# Patient Record
Sex: MALE | Race: WHITE | Employment: FULL TIME | URBAN - METROPOLITAN AREA
[De-identification: names, ages, dates, MRNs, and addresses within clinical notes are randomized per-mention and may not be internally consistent; named-entity substitution may affect disease eponyms.]

---

## 2021-09-24 ENCOUNTER — HOSPITAL ENCOUNTER (OUTPATIENT)
Dept: LAB | Age: 61
Discharge: HOME | End: 2021-09-24
Payer: COMMERCIAL

## 2021-09-24 PROCEDURE — 83519 RIA NONANTIBODY: CPT

## 2021-09-24 PROCEDURE — 36415 COLL VENOUS BLD VENIPUNCTURE: CPT

## 2021-10-04 LAB
FAX TO INFO,FAXT: NORMAL
FAX TO NUMBER,FAXN: NORMAL

## 2021-11-04 LAB
ACHR AB SER-SCNC: 10.6 NMOL/L (ref 0–0.24)
ACHR BLOCK AB SER-ACNC: 58 % (ref 0–25)
ACHR MOD AB/ACHR TOTAL SFR SER: ABNORMAL %

## 2021-11-29 ENCOUNTER — HOSPITAL ENCOUNTER (OUTPATIENT)
Dept: INFUSION THERAPY | Age: 61
Discharge: HOME | End: 2021-11-29
Payer: COMMERCIAL

## 2021-11-29 VITALS
RESPIRATION RATE: 18 BRPM | HEART RATE: 104 BPM | SYSTOLIC BLOOD PRESSURE: 127 MMHG | DIASTOLIC BLOOD PRESSURE: 81 MMHG | OXYGEN SATURATION: 98 % | TEMPERATURE: 97.5 F

## 2021-11-29 LAB
BASO+EOS+MONOS # BLD AUTO: 0.4 K/UL (ref 0–2.3)
BASO+EOS+MONOS NFR BLD AUTO: 11 % (ref 0.1–17)
DIFFERENTIAL METHOD BLD: ABNORMAL
ERYTHROCYTE [DISTWIDTH] IN BLOOD BY AUTOMATED COUNT: 14.5 % (ref 11.5–14.5)
HCT VFR BLD AUTO: 54.4 % (ref 36–48)
HGB BLD-MCNC: 9.5 G/DL (ref 12–16)
LYMPHOCYTES # BLD: 1 K/UL (ref 1.1–5.9)
LYMPHOCYTES NFR BLD: 24 % (ref 14–44)
MCH RBC QN AUTO: 15.7 PG (ref 25–35)
MCHC RBC AUTO-ENTMCNC: 17.5 G/DL (ref 31–37)
MCV RBC AUTO: 89.9 FL (ref 78–102)
NEUTS SEG # BLD: 2.8 K/UL (ref 1.8–9.5)
NEUTS SEG NFR BLD: 66 % (ref 40–70)
PLATELET # BLD AUTO: 315 K/UL (ref 140–440)
RBC # BLD AUTO: 6.05 M/UL (ref 4.1–5.1)
WBC # BLD AUTO: 4.2 K/UL (ref 4.5–13)

## 2021-11-29 PROCEDURE — 99195 PHLEBOTOMY: CPT

## 2021-11-29 PROCEDURE — 85025 COMPLETE CBC W/AUTO DIFF WBC: CPT

## 2021-11-29 PROCEDURE — 74011250636 HC RX REV CODE- 250/636: Performed by: FAMILY MEDICINE

## 2021-11-29 RX ORDER — ESCITALOPRAM OXALATE 20 MG/1
20 TABLET ORAL DAILY
COMMUNITY

## 2021-11-29 RX ORDER — SODIUM CHLORIDE 9 MG/ML
500 INJECTION, SOLUTION INTRAVENOUS CONTINUOUS
Status: DISCONTINUED | OUTPATIENT
Start: 2021-11-29 | End: 2021-11-30 | Stop reason: HOSPADM

## 2021-11-29 RX ADMIN — SODIUM CHLORIDE 500 ML: 0.9 INJECTION, SOLUTION INTRAVENOUS at 10:45

## 2021-11-29 NOTE — PROGRESS NOTES
[facility] Progress Note    Date: 2021    Name: Rikki Wilson    MRN: [id number]         : 1960      Mr. Saray Caban arrived to [facility] at 0900 foe labs to determine if he will require therapeutic phlebotomy. Oriented patient to unit and educated on  therapeutic phlebotomy procedure and provided [employer] handouts. Pt verbalized understanding. Mr. Saray Caban was assessed and education was provided. Mr. Yamilka Marquez vitals were reviewed. Visit Vitals  /81 (BP 1 Location: Left lower arm)   Pulse (!) 104   Temp 97.5 °F (36.4 °C)   Resp 18   SpO2 98%       Blood drawn for labs via Left forearm right, condition patent and no redness venipuncture x1 attempt using a 20 g PIV, brisk blood return, secured with tegaderm. Lab results were obtained and reviewed. Recent Results (from the past 12 hour(s))   CBC WITH 3 PART DIFF    Collection Time: 21  9:15 AM   Result Value Ref Range    WBC 4.2 (L) 4.5 - 13.0 K/uL    RBC 6.05 (H) 4.10 - 5.10 M/uL    HGB 9.5 (L) 12.0 - 16.0 g/dL    HCT 54.4 (HH) 36 - 48 %    MCV 89.9 78 - 102 FL    MCH 15.7 (L) 25.0 - 35.0 PG    MCHC 17.5 (L) 31 - 37 g/dL    RDW 14.5 11.5 - 14.5 %    PLATELET 162 452 - 670 K/uL    NEUTROPHILS 66 40 - 70 %    MIXED CELLS 11 0.1 - 17 %    LYMPHOCYTES 24 14 - 44 %    ABS. NEUTROPHILS 2.8 1.8 - 9.5 K/UL    ABS. MIXED CELLS 0.4 0.0 - 2.3 K/uL    ABS. LYMPHOCYTES 1.0 (L) 1.1 - 5.9 K/UL    DF AUTOMATED         Hgb 9.5 and Hct 54.4     Therapeutic phlebotomy initiated at 0930 via 20 g PIV. Therapeutic phlebotomy ended at 1045 with approximately 500 ml of blood obtained followed by 500ml of NS administered as post-phlebotomy hydration per orders. Pt offered snack/ drink throughout his visit. Mr. Saray Caban tolerated well without complaints. IV removed/ intact. Gauze/ coban to site. Mr. Saray Caban was discharged from [place] [address] in stable condition at 1115.   He is to return on 2021 at 0900 for his next appointment for labs and potential .therapeutic phlebotomy.     Delsa Leventhal, RN  November 29, 2021

## 2021-12-08 ENCOUNTER — HOSPITAL ENCOUNTER (OUTPATIENT)
Dept: INFUSION THERAPY | Age: 61
Discharge: HOME | End: 2021-12-08
Payer: COMMERCIAL

## 2021-12-08 VITALS
RESPIRATION RATE: 18 BRPM | TEMPERATURE: 98 F | HEART RATE: 100 BPM | DIASTOLIC BLOOD PRESSURE: 77 MMHG | OXYGEN SATURATION: 98 % | SYSTOLIC BLOOD PRESSURE: 138 MMHG

## 2021-12-08 LAB
BASO+EOS+MONOS # BLD AUTO: 0.5 K/UL (ref 0–2.3)
BASO+EOS+MONOS NFR BLD AUTO: 6 % (ref 0.1–17)
DIFFERENTIAL METHOD BLD: ABNORMAL
ERYTHROCYTE [DISTWIDTH] IN BLOOD BY AUTOMATED COUNT: 14.1 % (ref 11.5–14.5)
HCT VFR BLD AUTO: 53.3 % (ref 36–48)
HGB BLD-MCNC: 17.7 G/DL (ref 12–16)
LYMPHOCYTES # BLD: 1.7 K/UL (ref 1.1–5.9)
LYMPHOCYTES NFR BLD: 19 % (ref 14–44)
MCH RBC QN AUTO: 30.1 PG (ref 25–35)
MCHC RBC AUTO-ENTMCNC: 33.2 G/DL (ref 31–37)
MCV RBC AUTO: 90.5 FL (ref 78–102)
NEUTS SEG # BLD: 6.8 K/UL (ref 1.8–9.5)
NEUTS SEG NFR BLD: 76 % (ref 40–70)
PLATELET # BLD AUTO: 291 K/UL (ref 140–440)
RBC # BLD AUTO: 5.89 M/UL (ref 4.1–5.1)
WBC # BLD AUTO: 9 K/UL (ref 4.5–13)

## 2021-12-08 PROCEDURE — 74011250636 HC RX REV CODE- 250/636: Performed by: FAMILY MEDICINE

## 2021-12-08 PROCEDURE — 99195 PHLEBOTOMY: CPT

## 2021-12-08 PROCEDURE — 85025 COMPLETE CBC W/AUTO DIFF WBC: CPT

## 2021-12-08 RX ORDER — SODIUM CHLORIDE 9 MG/ML
500 INJECTION, SOLUTION INTRAVENOUS CONTINUOUS
Status: DISCONTINUED | OUTPATIENT
Start: 2021-12-08 | End: 2021-12-09 | Stop reason: HOSPADM

## 2021-12-08 RX ADMIN — SODIUM CHLORIDE 500 ML: 9 INJECTION, SOLUTION INTRAVENOUS at 10:35

## 2021-12-08 NOTE — PROGRESS NOTES
[facility] Progress Note    Date: 2021    Name: Brendon Gomez    MRN: [id number]         : 1960      Mr. Bhavik Hernandez arrived to [facility] at 0900 for labs to determine if he will require therapeutic phlebotomy. Pt denies any reactions or complications from last weeks therapeutic phlebotomy. Mr. Bhavik Hernandez was assessed and education was provided. Mr. Keo Vasquez vitals were reviewed. Visit Vitals  /77 (BP 1 Location: Left upper arm)   Pulse 100   Temp 98 °F (36.7 °C)   Resp 18   SpO2 98%       Blood drawn for labs via Left  hand condition patent and no redness venipuncture x1 attempt using a 20 g PIV, brisk blood return, secured with tegaderm. Lab results were obtained and reviewed. Recent Results (from the past 12 hour(s))   CBC WITH 3 PART DIFF    Collection Time: 21  9:15 AM   Result Value Ref Range    WBC 9.0 4.5 - 13.0 K/uL    RBC 5.89 (H) 4.10 - 5.10 M/uL    HGB 17.7 (HH) 12.0 - 16.0 g/dL    HCT 53.3 (HH) 36 - 48 %    MCV 90.5 78 - 102 FL    MCH 30.1 25.0 - 35.0 PG    MCHC 33.2 31 - 37 g/dL    RDW 14.1 11.5 - 14.5 %    PLATELET 986 673 - 915 K/uL    NEUTROPHILS 76 (H) 40 - 70 %    MIXED CELLS 6 0.1 - 17 %    LYMPHOCYTES 19 14 - 44 %    ABS. NEUTROPHILS 6.8 1.8 - 9.5 K/UL    ABS. MIXED CELLS 0.5 0.0 - 2.3 K/uL    ABS. LYMPHOCYTES 1.7 1.1 - 5.9 K/UL    DF AUTOMATED         Hgb 17.7 and Hct 53.3 ( normal for diagnosis, this is why patient is being treated in clinic)     Therapeutic phlebotomy initiated at 0930 via 20 g PIV. Therapeutic phlebotomy ended at 1035 with approximately 500 ml of blood obtained followed by 500ml of NS administered as post-phlebotomy hydration per orders. Pt offered snack/ drink throughout his visit. Mr. Bhavik Hernandez tolerated well without complaints. IV removed/ intact. Gauze/ coban to site. Mr. Bhavik Hernandez was discharged from [place] [address] in stable condition at 1110.   He is to return on 12/15/2021 at 0900 for his next appointment for labs and potential .therapeutic phlebotomy.     Yamilet Nichols RN  December 8, 2021

## 2021-12-15 ENCOUNTER — HOSPITAL ENCOUNTER (OUTPATIENT)
Dept: INFUSION THERAPY | Age: 61
Discharge: HOME | End: 2021-12-15
Payer: COMMERCIAL

## 2021-12-15 VITALS
RESPIRATION RATE: 18 BRPM | HEART RATE: 86 BPM | OXYGEN SATURATION: 98 % | TEMPERATURE: 98.3 F | SYSTOLIC BLOOD PRESSURE: 150 MMHG | DIASTOLIC BLOOD PRESSURE: 81 MMHG

## 2021-12-15 LAB
BASO+EOS+MONOS # BLD AUTO: 0.6 K/UL (ref 0–2.3)
BASO+EOS+MONOS NFR BLD AUTO: 7 % (ref 0.1–17)
DIFFERENTIAL METHOD BLD: ABNORMAL
ERYTHROCYTE [DISTWIDTH] IN BLOOD BY AUTOMATED COUNT: 14.1 % (ref 11.5–14.5)
HCT VFR BLD AUTO: 52.8 % (ref 36–48)
HGB BLD-MCNC: 17.2 G/DL (ref 12–16)
LYMPHOCYTES # BLD: 1.9 K/UL (ref 1.1–5.9)
LYMPHOCYTES NFR BLD: 21 % (ref 14–44)
MCH RBC QN AUTO: 29.5 PG (ref 25–35)
MCHC RBC AUTO-ENTMCNC: 32.6 G/DL (ref 31–37)
MCV RBC AUTO: 90.4 FL (ref 78–102)
NEUTS SEG # BLD: 6.9 K/UL (ref 1.8–9.5)
NEUTS SEG NFR BLD: 73 % (ref 40–70)
PLATELET # BLD AUTO: 289 K/UL (ref 140–440)
RBC # BLD AUTO: 5.84 M/UL (ref 4.1–5.1)
WBC # BLD AUTO: 9.4 K/UL (ref 4.5–13)

## 2021-12-15 PROCEDURE — 85025 COMPLETE CBC W/AUTO DIFF WBC: CPT

## 2021-12-15 PROCEDURE — 74011250636 HC RX REV CODE- 250/636: Performed by: FAMILY MEDICINE

## 2021-12-15 PROCEDURE — 99195 PHLEBOTOMY: CPT

## 2021-12-15 RX ORDER — SODIUM CHLORIDE 0.9 % (FLUSH) 0.9 %
5-10 SYRINGE (ML) INJECTION AS NEEDED
Status: DISCONTINUED | OUTPATIENT
Start: 2021-12-15 | End: 2021-12-17 | Stop reason: HOSPADM

## 2021-12-15 RX ORDER — SODIUM CHLORIDE 9 MG/ML
500 INJECTION, SOLUTION INTRAVENOUS CONTINUOUS
Status: DISCONTINUED | OUTPATIENT
Start: 2021-12-15 | End: 2021-12-16 | Stop reason: HOSPADM

## 2021-12-15 RX ADMIN — Medication 10 ML: at 11:00

## 2021-12-15 RX ADMIN — SODIUM CHLORIDE 500 ML: 0.9 INJECTION, SOLUTION INTRAVENOUS at 10:05

## 2021-12-15 NOTE — PROGRESS NOTES
[facility]    Date: December 15, 2021    Name: Cameron Carter    MRN: [id number]         : 1960      Mr. Kiera Crawford arrived to [facility] at 0900 for labs to determine if he will require therapeutic phlebotomy. Pt denies any reactions or complications from last weeks therapeutic phlebotomy. Mr. Kiera Crawford was assessed and education was provided. Mr. Jose Enrique Valdovinos vitals were reviewed. Visit Vitals  BP (!) 150/81 (BP 1 Location: Left upper arm)   Pulse 86   Temp 98.3 °F (36.8 °C)   Resp 18   SpO2 98%       Blood drawn for labs via Left  hand condition patent and no redness venipuncture x2 attempt using a 20 g PIV, brisk blood return, secured with tegaderm. Lab results were obtained and reviewed. Recent Results (from the past 12 hour(s))   CBC WITH 3 PART DIFF    Collection Time: 12/15/21  9:15 AM   Result Value Ref Range    WBC 9.4 4.5 - 13.0 K/uL    RBC 5.84 (H) 4.10 - 5.10 M/uL    HGB 17.2 (HH) 12.0 - 16.0 g/dL    HCT 52.8 (HH) 36 - 48 %    MCV 90.4 78 - 102 FL    MCH 29.5 25.0 - 35.0 PG    MCHC 32.6 31 - 37 g/dL    RDW 14.1 11.5 - 14.5 %    PLATELET 297 651 - 140 K/uL    NEUTROPHILS 73 (H) 40 - 70 %    MIXED CELLS 7 0.1 - 17 %    LYMPHOCYTES 21 14 - 44 %    ABS. NEUTROPHILS 6.9 1.8 - 9.5 K/UL    ABS. MIXED CELLS 0.6 0.0 - 2.3 K/uL    ABS. LYMPHOCYTES 1.9 1.1 - 5.9 K/UL    DF AUTOMATED         Hgb 17.2 and Hct 52.8 ( normal for diagnosis, this is why patient is being treated in clinic)     Therapeutic phlebotomy initiated at 0925 via 20 g PIV. Therapeutic phlebotomy ended at 1000 with approximately 500 ml of blood obtained followed by 500ml of NS administered as post-phlebotomy hydration per orders. Pt offered snack/ drink throughout his visit. Mr. Kiera Crawford tolerated well without complaints. IV removed/ intact. Gauze/ coban to site. Mr. Kiera Crawford was discharged from [place] [address] in stable condition at 1100.   He is to return on 2021 at 0900 for his next appointment for labs and potential .therapeutic phlebotomy.     Calvin Sheffield RN  December 15, 2021

## 2021-12-22 ENCOUNTER — APPOINTMENT (OUTPATIENT)
Dept: INFUSION THERAPY | Age: 61
End: 2021-12-22
Payer: COMMERCIAL

## 2021-12-23 ENCOUNTER — HOSPITAL ENCOUNTER (OUTPATIENT)
Dept: INFUSION THERAPY | Age: 61
Discharge: HOME | End: 2021-12-23
Payer: COMMERCIAL

## 2021-12-23 VITALS
OXYGEN SATURATION: 95 % | DIASTOLIC BLOOD PRESSURE: 80 MMHG | TEMPERATURE: 98 F | HEART RATE: 74 BPM | SYSTOLIC BLOOD PRESSURE: 150 MMHG | RESPIRATION RATE: 18 BRPM

## 2021-12-23 LAB
BASO+EOS+MONOS # BLD AUTO: 0.5 K/UL (ref 0–2.3)
BASO+EOS+MONOS NFR BLD AUTO: 7 % (ref 0.1–17)
DIFFERENTIAL METHOD BLD: ABNORMAL
ERYTHROCYTE [DISTWIDTH] IN BLOOD BY AUTOMATED COUNT: 13.8 % (ref 11.5–14.5)
HCT VFR BLD AUTO: 47.1 % (ref 36–48)
HGB BLD-MCNC: 15.6 G/DL (ref 12–16)
LYMPHOCYTES # BLD: 1.5 K/UL (ref 1.1–5.9)
LYMPHOCYTES NFR BLD: 20 % (ref 14–44)
MCH RBC QN AUTO: 29.9 PG (ref 25–35)
MCHC RBC AUTO-ENTMCNC: 33.1 G/DL (ref 31–37)
MCV RBC AUTO: 90.4 FL (ref 78–102)
NEUTS SEG # BLD: 5.7 K/UL (ref 1.8–9.5)
NEUTS SEG NFR BLD: 73 % (ref 40–70)
PLATELET # BLD AUTO: 284 K/UL (ref 140–440)
RBC # BLD AUTO: 5.21 M/UL (ref 4.1–5.1)
WBC # BLD AUTO: 7.7 K/UL (ref 4.5–13)

## 2021-12-23 PROCEDURE — 36415 COLL VENOUS BLD VENIPUNCTURE: CPT

## 2021-12-23 PROCEDURE — 85025 COMPLETE CBC W/AUTO DIFF WBC: CPT

## 2021-12-23 NOTE — PROGRESS NOTES
[facility] Progress Note    Date: 2021    Name: Teresa Ibarra    MRN: [id number]         : 1960      Mr. Sunday Delgado arrived to [facility] at 1100 for labs to determine if he will require therapeutic phlebotomy. Pt denies any reactions or complications from last weeks therapeutic phlebotomy. Mr. Sunday Delgado was assessed and education was provided. Mr. Fernando Marcial vitals were reviewed. Visit Vitals  BP (!) 150/80 (BP 1 Location: Left upper arm, BP Patient Position: Sitting)   Pulse 74   Temp 98 °F (36.7 °C)   Resp 18   SpO2 95%       20 gauge PIV placed to his left hand x 1 attempt per patient request. Brisk blood return noted. CBC obtained. Lab results were obtained and reviewed. Recent Results (from the past 12 hour(s))   CBC WITH 3 PART DIFF    Collection Time: 21 11:02 AM   Result Value Ref Range    WBC 7.7 4.5 - 13.0 K/uL    RBC 5.21 (H) 4.10 - 5.10 M/uL    HGB 15.6 12.0 - 16.0 g/dL    HCT 47.1 36 - 48 %    MCV 90.4 78 - 102 FL    MCH 29.9 25.0 - 35.0 PG    MCHC 33.1 31 - 37 g/dL    RDW 13.8 11.5 - 14.5 %    PLATELET 849 050 - 602 K/uL    NEUTROPHILS 73 (H) 40 - 70 %    MIXED CELLS 7 0.1 - 17 %    LYMPHOCYTES 20 14 - 44 %    ABS. NEUTROPHILS 5.7 1.8 - 9.5 K/UL    ABS. MIXED CELLS 0.5 0.0 - 2.3 K/uL    ABS. LYMPHOCYTES 1.5 1.1 - 5.9 K/UL    DF AUTOMATED         Hgb 15.6 and Hct 47.1    Therapeutic phlebotomy HELD for Hct <50.    yMr. Arnulfo tolerated well without complaints. IV removed/ intact. Gauze/ coban to site. Mr. Sunday Delgado was discharged from [place] [address] in stable condition at 1115. He is to return on 2022 at 0900 for his next appointment for labs and potential therapeutic phlebotomy.     Kanika Wood  2021

## 2021-12-29 ENCOUNTER — TRANSCRIBE ORDER (OUTPATIENT)
Dept: SCHEDULING | Age: 61
End: 2021-12-29

## 2021-12-29 DIAGNOSIS — G70.00 MYASTHENIA GRAVIS WITHOUT EXACERBATION (HCC): Primary | ICD-10-CM

## 2022-01-12 ENCOUNTER — HOSPITAL ENCOUNTER (OUTPATIENT)
Dept: CT IMAGING | Age: 62
Discharge: HOME | End: 2022-01-12
Attending: PSYCHIATRY & NEUROLOGY
Payer: COMMERCIAL

## 2022-01-12 DIAGNOSIS — G70.00 MYASTHENIA GRAVIS WITHOUT EXACERBATION (HCC): ICD-10-CM

## 2022-01-12 PROCEDURE — 71260 CT THORAX DX C+: CPT

## 2022-01-12 PROCEDURE — 74011000636 HC RX REV CODE- 636: Performed by: PSYCHIATRY & NEUROLOGY

## 2022-01-12 RX ADMIN — IOPAMIDOL 100 ML: 612 INJECTION, SOLUTION INTRAVENOUS at 14:22

## 2022-01-26 ENCOUNTER — HOSPITAL ENCOUNTER (OUTPATIENT)
Dept: INFUSION THERAPY | Age: 62
Discharge: HOME | End: 2022-01-26
Payer: COMMERCIAL

## 2022-01-26 VITALS
TEMPERATURE: 97 F | OXYGEN SATURATION: 93 % | HEART RATE: 63 BPM | DIASTOLIC BLOOD PRESSURE: 80 MMHG | SYSTOLIC BLOOD PRESSURE: 122 MMHG | RESPIRATION RATE: 18 BRPM

## 2022-01-26 LAB
BASO+EOS+MONOS # BLD AUTO: 0.6 K/UL (ref 0–2.3)
BASO+EOS+MONOS NFR BLD AUTO: 7 % (ref 0.1–17)
DIFFERENTIAL METHOD BLD: ABNORMAL
ERYTHROCYTE [DISTWIDTH] IN BLOOD BY AUTOMATED COUNT: 13.5 % (ref 11.5–14.5)
HCT VFR BLD AUTO: 51.4 % (ref 36–48)
HGB BLD-MCNC: 16.7 G/DL (ref 12–16)
LYMPHOCYTES # BLD: 2 K/UL (ref 1.1–5.9)
LYMPHOCYTES NFR BLD: 22 % (ref 14–44)
MCH RBC QN AUTO: 28.8 PG (ref 25–35)
MCHC RBC AUTO-ENTMCNC: 32.5 G/DL (ref 31–37)
MCV RBC AUTO: 88.8 FL (ref 78–102)
NEUTS SEG # BLD: 6.5 K/UL (ref 1.8–9.5)
NEUTS SEG NFR BLD: 72 % (ref 40–70)
RBC # BLD AUTO: 5.79 M/UL (ref 4.1–5.1)
WBC # BLD AUTO: 9.1 K/UL (ref 4.5–13)

## 2022-01-26 PROCEDURE — 99195 PHLEBOTOMY: CPT

## 2022-01-26 PROCEDURE — 74011250636 HC RX REV CODE- 250/636: Performed by: FAMILY MEDICINE

## 2022-01-26 PROCEDURE — 85025 COMPLETE CBC W/AUTO DIFF WBC: CPT

## 2022-01-26 PROCEDURE — 36415 COLL VENOUS BLD VENIPUNCTURE: CPT

## 2022-01-26 PROCEDURE — 74011000250 HC RX REV CODE- 250: Performed by: FAMILY MEDICINE

## 2022-01-26 RX ORDER — SODIUM CHLORIDE 9 MG/ML
250 INJECTION, SOLUTION INTRAVENOUS ONCE
Status: COMPLETED | OUTPATIENT
Start: 2022-01-26 | End: 2022-01-26

## 2022-01-26 RX ORDER — SODIUM CHLORIDE 9 MG/ML
10-40 INJECTION INTRAMUSCULAR; INTRAVENOUS; SUBCUTANEOUS AS NEEDED
Status: DISCONTINUED | OUTPATIENT
Start: 2022-01-26 | End: 2022-01-27 | Stop reason: HOSPADM

## 2022-01-26 RX ADMIN — SODIUM CHLORIDE 250 ML: 0.9 INJECTION, SOLUTION INTRAVENOUS at 11:05

## 2022-01-26 RX ADMIN — SODIUM CHLORIDE, PRESERVATIVE FREE 10 ML: 5 INJECTION INTRAVENOUS at 11:06

## 2022-01-26 NOTE — PROGRESS NOTES
[facility] Progress Note    Date: 2022    Name: Vaughn Roman    MRN: [id number]         : 1960      Mr. Mikayla Amador arrived to [facility] at 6905 for labs to determine if he will require therapeutic phlebotomy. Pt denies any reactions or complications from last weeks therapeutic phlebotomy. Mr. Mikayla Amador was assessed and education was provided. Mr. Jaquita Oppenheim vitals were reviewed. Visit Vitals  /88   Pulse 68   Temp 97 °F (36.1 °C)   Resp 18   SpO2 93%       Blood drawn for labs via right hand condition patent and no redness venipuncture x1 attempt using a 23g collection needle, brisk blood return, applied Band-Aid to site. Lab results were obtained and reviewed. Recent Results (from the past 12 hour(s))   CBC WITH 3 PART DIFF    Collection Time: 22  9:00 AM   Result Value Ref Range    WBC 9.1 4.5 - 13.0 K/uL    RBC 5.79 (H) 4.10 - 5.10 M/uL    HGB 16.7 (HH) 12.0 - 16.0 g/dL    HCT 51.4 (HH) 36 - 48 %    MCV 88.8 78 - 102 FL    MCH 28.8 25.0 - 35.0 PG    MCHC 32.5 31 - 37 g/dL    RDW 13.5 11.5 - 14.5 %    NEUTROPHILS 72 (H) 40 - 70 %    MIXED CELLS 7 0.1 - 17 %    LYMPHOCYTES 22 14 - 44 %    ABS. NEUTROPHILS 6.5 1.8 - 9.5 K/UL    ABS. MIXED CELLS 0.6 0.0 - 2.3 K/uL    ABS. LYMPHOCYTES 2.0 1.1 - 5.9 K/UL    DF AUTOMATED         Hgb 16.7 and Hct 51.4 (normal for diagnosis, this is why patient is being treated in clinic)     Therapeutic phlebotomy initiated at 0930 via 20 g PIV. Therapeutic phlebotomy ended at 1035 with approximately 500 ml of blood obtained followed by 250ml of NS administered as post-phlebotomy hydration per orders. Pt offered snack/ drink throughout his visit. Mr. Mikayla Amador tolerated well without complaints. Discharge instructions given, patient gave verbal understanding. IV removed/ intact. Gauze/ tape to site. Mr. Mikayla Amador was discharged from [place] [address] in stable condition at 1135.   He is to return on 22 at 0900 for his next appointment for labs and potential .therapeutic phlebotomy.     Pallavi Huthcison RN  January 26, 2022

## 2022-02-02 ENCOUNTER — HOSPITAL ENCOUNTER (OUTPATIENT)
Dept: INFUSION THERAPY | Age: 62
Discharge: HOME | End: 2022-02-02
Payer: COMMERCIAL

## 2022-02-02 VITALS
DIASTOLIC BLOOD PRESSURE: 83 MMHG | HEART RATE: 67 BPM | RESPIRATION RATE: 18 BRPM | OXYGEN SATURATION: 99 % | SYSTOLIC BLOOD PRESSURE: 130 MMHG | TEMPERATURE: 98.1 F

## 2022-02-02 LAB
BASO+EOS+MONOS # BLD AUTO: 0.6 K/UL (ref 0–2.3)
BASO+EOS+MONOS NFR BLD AUTO: 7 % (ref 0.1–17)
DIFFERENTIAL METHOD BLD: ABNORMAL
ERYTHROCYTE [DISTWIDTH] IN BLOOD BY AUTOMATED COUNT: 13.6 % (ref 11.5–14.5)
HCT VFR BLD AUTO: 46.3 % (ref 36–48)
HGB BLD-MCNC: 14.1 G/DL (ref 12–16)
LYMPHOCYTES # BLD: 1.9 K/UL (ref 1.1–5.9)
LYMPHOCYTES NFR BLD: 23 % (ref 14–44)
MCH RBC QN AUTO: 27.1 PG (ref 25–35)
MCHC RBC AUTO-ENTMCNC: 30.5 G/DL (ref 31–37)
MCV RBC AUTO: 89 FL (ref 78–102)
NEUTS SEG # BLD: 5.5 K/UL (ref 1.8–9.5)
NEUTS SEG NFR BLD: 70 % (ref 40–70)
PLATELET # BLD AUTO: 304 K/UL (ref 140–440)
RBC # BLD AUTO: 5.2 M/UL (ref 4.1–5.1)
WBC # BLD AUTO: 8 K/UL (ref 4.5–13)

## 2022-02-02 PROCEDURE — 36415 COLL VENOUS BLD VENIPUNCTURE: CPT

## 2022-02-02 PROCEDURE — 85025 COMPLETE CBC W/AUTO DIFF WBC: CPT

## 2022-02-02 NOTE — PROGRESS NOTES
[facility] Progress Note    Date: 2022    Name: Domenico Torres    MRN: [id number]         : 1960     Therapeutic Phlebotomy      Mr. Иван Lazaro arrived to [facility] at 0900 for labs to determine if he will require therapeutic phlebotomy. Pt denies any reactions or complications from last weeks therapeutic phlebotomy. Mr. Иван Lazaro was assessed and education was provided. Mr. Jose Aiken vitals were reviewed. Visit Vitals  /83 (BP 1 Location: Right upper arm, BP Patient Position: Sitting)   Pulse 67   Temp 98.1 °F (36.7 °C)   SpO2 99%       20 gauge PIV placed to his left hand x forth attempt per patient request. Brisk blood return noted. CBC obtained. Lab results were obtained and reviewed. Recent Results (from the past 12 hour(s))   CBC WITH 3 PART DIFF    Collection Time: 22  9:40 AM   Result Value Ref Range    WBC 8.0 4.5 - 13.0 K/uL    RBC 5.20 (H) 4.10 - 5.10 M/uL    HGB 14.1 12.0 - 16.0 g/dL    HCT 46.3 36 - 48 %    MCV 89.0 78 - 102 FL    MCH 27.1 25.0 - 35.0 PG    MCHC 30.5 (L) 31 - 37 g/dL    RDW 13.6 11.5 - 14.5 %    PLATELET 256 678 - 651 K/uL    NEUTROPHILS 70 40 - 70 %    MIXED CELLS 7 0.1 - 17 %    LYMPHOCYTES 23 14 - 44 %    ABS. NEUTROPHILS 5.5 1.8 - 9.5 K/UL    ABS. MIXED CELLS 0.6 0.0 - 2.3 K/uL    ABS. LYMPHOCYTES 1.9 1.1 - 5.9 K/UL    DF AUTOMATED         Hgb 14.1 and Hct 46.3 (Expected outcome for diagnosis). Therapeutic phlebotomy HELD for Hct <50. He will be seen again in a month per orders. Mr. Иван Lazaro tolerated well without complaints. IV removed/ intact. Gauze/ tape to site. Mr. Иван Lazaro was discharged from [place] [address] in stable condition at 46. He is to return on 22 at 0900 for his next appointment for labs and potential therapeutic phlebotomy.     Jairo Niño RN  2022

## 2022-03-02 ENCOUNTER — HOSPITAL ENCOUNTER (OUTPATIENT)
Dept: INFUSION THERAPY | Age: 62
Discharge: HOME | End: 2022-03-02
Payer: COMMERCIAL

## 2022-03-02 VITALS
OXYGEN SATURATION: 97 % | SYSTOLIC BLOOD PRESSURE: 155 MMHG | RESPIRATION RATE: 18 BRPM | TEMPERATURE: 98.7 F | DIASTOLIC BLOOD PRESSURE: 80 MMHG | HEART RATE: 70 BPM

## 2022-03-02 LAB
BASO+EOS+MONOS # BLD AUTO: 0.7 K/UL (ref 0–2.3)
BASO+EOS+MONOS NFR BLD AUTO: 8 % (ref 0.1–17)
DIFFERENTIAL METHOD BLD: ABNORMAL
ERYTHROCYTE [DISTWIDTH] IN BLOOD BY AUTOMATED COUNT: 13.2 % (ref 11.5–14.5)
HCT VFR BLD AUTO: 48.3 % (ref 36–48)
HGB BLD-MCNC: 15.5 G/DL (ref 12–16)
LYMPHOCYTES # BLD: 1.9 K/UL (ref 1.1–5.9)
LYMPHOCYTES NFR BLD: 22 % (ref 14–44)
MCH RBC QN AUTO: 28.1 PG (ref 25–35)
MCHC RBC AUTO-ENTMCNC: 32.1 G/DL (ref 31–37)
MCV RBC AUTO: 87.5 FL (ref 78–102)
NEUTS SEG # BLD: 6.2 K/UL (ref 1.8–9.5)
NEUTS SEG NFR BLD: 70 % (ref 40–70)
PLATELET # BLD AUTO: 323 K/UL (ref 140–440)
RBC # BLD AUTO: 5.52 M/UL (ref 4.1–5.1)
WBC # BLD AUTO: 8.8 K/UL (ref 4.5–13)

## 2022-03-02 PROCEDURE — 85025 COMPLETE CBC W/AUTO DIFF WBC: CPT

## 2022-03-02 PROCEDURE — 36415 COLL VENOUS BLD VENIPUNCTURE: CPT

## 2022-03-02 NOTE — PROGRESS NOTES
[facility] Progress Note    Date: 2022    Name: Abhilash Toledo    MRN: [id number]         : 1960     Therapeutic Phlebotomy      Mr. Juvencio Gaming arrived to [address] [place] [address] at 1282 for CBC to determine if he will require therapeutic phlebotomy. Pt denies any reactions or complications from last weeks therapeutic phlebotomy. Mr. Juvencio Gaming was assessed and education was provided. Mr. Patricio Loud vitals were reviewed. Visit Vitals  BP (!) 155/80 (BP 1 Location: Right upper arm, BP Patient Position: At rest)   Pulse 70   Temp 98.7 °F (37.1 °C)   Resp 18   SpO2 97%       20 gauge PIV placed to his left hand x forth attempt per patient request. Brisk blood return noted. CBC obtained. Lab results were obtained and reviewed. Recent Results (from the past 12 hour(s))   CBC WITH 3 PART DIFF    Collection Time: 22  9:03 AM   Result Value Ref Range    WBC 8.8 4.5 - 13.0 K/uL    RBC 5.52 (H) 4.10 - 5.10 M/uL    HGB 15.5 12.0 - 16.0 g/dL    HCT 48.3 (H) 36 - 48 %    MCV 87.5 78 - 102 FL    MCH 28.1 25.0 - 35.0 PG    MCHC 32.1 31 - 37 g/dL    RDW 13.2 11.5 - 14.5 %    PLATELET 278 824 - 048 K/uL    NEUTROPHILS 70 40 - 70 %    Mixed cells 8 0.1 - 17 %    LYMPHOCYTES 22 14 - 44 %    ABS. NEUTROPHILS 6.2 1.8 - 9.5 K/UL    ABS. MIXED CELLS 0.7 0.0 - 2.3 K/uL    ABS. LYMPHOCYTES 1.9 1.1 - 5.9 K/UL    DF AUTOMATED         Hgb 15.5 and Hct 48.3 (Expected outcome for diagnosis). Therapeutic phlebotomy HELD for Hct <50. He will be seen again in a month per orders. Mr. Juvencio aGming tolerated well without complaints. IV removed/ intact. Gauze/ tape to site. Mr. Juvencio Gaming was discharged from [place] [address] in stable condition at 0910. He is to return on 22 at 0800 for his next appointment for labs and potential therapeutic phlebotomy.     Beulah Costa RN  2022

## 2022-03-30 ENCOUNTER — APPOINTMENT (OUTPATIENT)
Dept: INFUSION THERAPY | Age: 62
End: 2022-03-30
Payer: COMMERCIAL

## 2022-04-13 ENCOUNTER — HOSPITAL ENCOUNTER (OUTPATIENT)
Dept: INFUSION THERAPY | Age: 62
Discharge: HOME | End: 2022-04-13
Payer: COMMERCIAL

## 2022-04-13 VITALS
SYSTOLIC BLOOD PRESSURE: 165 MMHG | OXYGEN SATURATION: 98 % | HEART RATE: 86 BPM | RESPIRATION RATE: 18 BRPM | TEMPERATURE: 98.2 F | DIASTOLIC BLOOD PRESSURE: 92 MMHG

## 2022-04-13 PROCEDURE — 99195 PHLEBOTOMY: CPT

## 2022-04-13 NOTE — PROGRESS NOTES
[facility] Progress Note    Date: 2022    Name: Gia Pinto    MRN: [id number]         : 1960      Mr. Dwayne Arias arrived to [facility] at 0800 for labs to determine if he will require therapeutic phlebotomy. Pt denies any reactions or complications from last weeks therapeutic phlebotomy. Mr. Dwayne Arias was assessed and education was provided. Mr. Laura Edmondson vitals were reviewed. Visit Vitals  BP (!) 165/92   Pulse 86   Temp 98.2 °F (36.8 °C)   Resp 18   SpO2 98%       Blood drawn for labs via Left  hand condition patent and no redness venipuncture x3 attempt using a 20 g PIV, brisk blood return, secured with tegaderm. Hgb 15.5  and Hct 50.9 ( normal for diagnosis, this is why patient is being treated in clinic)     See labs scanned in chart     Therapeutic phlebotomy initiated at 0820 via 20 g PIV. Therapeutic phlebotomy ended at 1000 with approximately 500 ml of blood obtained. Visual blood clots visualized in PIV, flushed site multiple times, and removed blood via 10 ml syringe ( with stop cock, to assure a closed system)    Pt was very tolerating of extra time required for procedure. Pt offered snack/ drink throughout his visit. Mr. Dwayne Arias tolerated well without complaints. Pt kindly declined 500 ml normal saline IV, witnessed patient drinking 32 ounces of ice water after to procedure to replace fluids. IV removed/ intact. Gauze/ coban to site. Mr. Dwayne Arias was discharged from [place] [address] in stable condition at 1000. He is to return on 2021 at 0800 for his next appointment for labs and potential .therapeutic phlebotomy.     Enrique Barbosa RN  2022

## 2022-04-21 ENCOUNTER — HOSPITAL ENCOUNTER (OUTPATIENT)
Dept: INFUSION THERAPY | Age: 62
Discharge: HOME | End: 2022-04-21
Payer: COMMERCIAL

## 2022-04-21 VITALS
TEMPERATURE: 98.2 F | SYSTOLIC BLOOD PRESSURE: 141 MMHG | DIASTOLIC BLOOD PRESSURE: 75 MMHG | RESPIRATION RATE: 18 BRPM | OXYGEN SATURATION: 97 % | HEART RATE: 75 BPM

## 2022-04-21 LAB
BASO+EOS+MONOS # BLD AUTO: 0.6 K/UL (ref 0–2.3)
BASO+EOS+MONOS NFR BLD AUTO: 8 % (ref 0.1–17)
DIFFERENTIAL METHOD BLD: ABNORMAL
ERYTHROCYTE [DISTWIDTH] IN BLOOD BY AUTOMATED COUNT: 14.1 % (ref 11.5–14.5)
HCT VFR BLD AUTO: 48 % (ref 36–48)
HGB BLD-MCNC: 15.2 G/DL (ref 12–16)
LYMPHOCYTES # BLD: 1.6 K/UL (ref 1.1–5.9)
LYMPHOCYTES NFR BLD: 20 % (ref 14–44)
MCH RBC QN AUTO: 27 PG (ref 25–35)
MCHC RBC AUTO-ENTMCNC: 31.7 G/DL (ref 31–37)
MCV RBC AUTO: 85.4 FL (ref 78–102)
NEUTS SEG # BLD: 5.6 K/UL (ref 1.8–9.5)
NEUTS SEG NFR BLD: 72 % (ref 40–70)
PLATELET # BLD AUTO: 322 K/UL (ref 140–440)
RBC # BLD AUTO: 5.62 M/UL (ref 4.1–5.1)
WBC # BLD AUTO: 7.8 K/UL (ref 4.5–13)

## 2022-04-21 PROCEDURE — 36415 COLL VENOUS BLD VENIPUNCTURE: CPT

## 2022-04-21 PROCEDURE — 85025 COMPLETE CBC W/AUTO DIFF WBC: CPT

## 2022-04-21 NOTE — PROGRESS NOTES
[facility] Progress Note    Date: 2022    Name: Kayleen Bautista    MRN: [id number]         : 1960      Therapeutic Phlebotomy/POC LABS    Mr. Lamar Thompson arrived to [facility] at 0800 for labs to determine if he will require therapeutic phlebotomy. Pt denies any reactions or complications from last weeks therapeutic phlebotomy. Mr. Lamar Thompson was assessed and education was provided. Mr. Kiana Bunn vitals were reviewed. Visit Vitals  BP (!) 141/75   Pulse 75   Temp 98.2 °F (36.8 °C)   Resp 18   SpO2 97%       Blood drawn for labs via right hand condition patent and no redness venipuncture x1 attempt using a 23g collection needle, brisk blood return, applied Band-Aid to site. Lab results were obtained and reviewed. Recent Results (from the past 12 hour(s))   CBC WITH 3 PART DIFF    Collection Time: 22  8:15 AM   Result Value Ref Range    WBC 7.8 4.5 - 13.0 K/uL    RBC 5.62 (H) 4.10 - 5.10 M/uL    HGB 15.2 12.0 - 16.0 g/dL    HCT 48.0 36 - 48 %    MCV 85.4 78 - 102 FL    MCH 27.0 25.0 - 35.0 PG    MCHC 31.7 31 - 37 g/dL    RDW 14.1 11.5 - 14.5 %    PLATELET 321 902 - 971 K/uL    NEUTROPHILS 72 (H) 40 - 70 %    Mixed cells 8 0.1 - 17 %    LYMPHOCYTES 20 14 - 44 %    ABS. NEUTROPHILS 5.6 1.8 - 9.5 K/UL    ABS. MIXED CELLS 0.6 0.0 - 2.3 K/uL    ABS. LYMPHOCYTES 1.6 1.1 - 5.9 K/UL    DF AUTOMATED         Hgb 15.2 and Hct 48.0 (normal for diagnosis, this is why patient is being treated in clinic)     Therapeutic phlebotomy held per orders. Mr. Lamar Thompson tolerated well without complaints. Discharge instructions given, patient gave verbal understanding. Mr. Lamar Thompson was discharged from [place] [address] at [address] [place] [address]. He is to return on 22 at 0800 for his next appointment for labs and potential therapeutic phlebotomy.     Kirk Rodriguez RN  2022

## 2022-05-19 ENCOUNTER — HOSPITAL ENCOUNTER (OUTPATIENT)
Dept: INFUSION THERAPY | Age: 62
Discharge: HOME | End: 2022-05-19
Payer: COMMERCIAL

## 2022-05-19 VITALS
TEMPERATURE: 97.9 F | RESPIRATION RATE: 18 BRPM | SYSTOLIC BLOOD PRESSURE: 146 MMHG | OXYGEN SATURATION: 97 % | DIASTOLIC BLOOD PRESSURE: 75 MMHG | HEART RATE: 72 BPM

## 2022-05-19 LAB
BASO+EOS+MONOS # BLD AUTO: 0.6 K/UL (ref 0–2.3)
BASO+EOS+MONOS NFR BLD AUTO: 6 % (ref 0.1–17)
DIFFERENTIAL METHOD BLD: ABNORMAL
ERYTHROCYTE [DISTWIDTH] IN BLOOD BY AUTOMATED COUNT: 14.9 % (ref 11.5–14.5)
HCT VFR BLD AUTO: 46.2 % (ref 36–48)
HGB BLD-MCNC: 14.6 G/DL (ref 12–16)
LYMPHOCYTES # BLD: 1.5 K/UL (ref 1.1–5.9)
LYMPHOCYTES NFR BLD: 14 % (ref 14–44)
MCH RBC QN AUTO: 26.9 PG (ref 25–35)
MCHC RBC AUTO-ENTMCNC: 31.6 G/DL (ref 31–37)
MCV RBC AUTO: 85.2 FL (ref 78–102)
NEUTS SEG # BLD: 8.7 K/UL (ref 1.8–9.5)
NEUTS SEG NFR BLD: 80 % (ref 40–70)
PLATELET # BLD AUTO: 324 K/UL (ref 140–440)
RBC # BLD AUTO: 5.42 M/UL (ref 4.1–5.1)
WBC # BLD AUTO: 10.8 K/UL (ref 4.5–13)

## 2022-05-19 PROCEDURE — 36415 COLL VENOUS BLD VENIPUNCTURE: CPT

## 2022-05-19 PROCEDURE — 85025 COMPLETE CBC W/AUTO DIFF WBC: CPT

## 2022-05-19 RX ORDER — MYCOPHENOLATE MOFETIL 500 MG/1
500 TABLET ORAL 2 TIMES DAILY
COMMUNITY

## 2022-05-19 RX ORDER — PREDNISONE 20 MG/1
7.5 TABLET ORAL
COMMUNITY

## 2022-05-19 NOTE — PROGRESS NOTES
[facility] Progress Note    Date: May 19, 2022    Name: Izabela Gallegos    MRN: [id number]         : 1960      Therapeutic Phlebotomy/POC LABS    Mr. Claudio Boyer arrived to [facility] at 0800 for labs to determine if he will require therapeutic phlebotomy. Mr. Claudio Boyer was assessed and education was provided. Mr. Emily Booker vitals were reviewed. Visit Vitals  BP (!) 146/75   Pulse 72   Temp 97.9 °F (36.6 °C)   Resp 18   SpO2 97%       Blood drawn for labs via right hand condition patent and no redness venipuncture x1 attempt using a 23g collection needle, brisk blood return, gauze and coban applied to site. Lab results were obtained and reviewed. Recent Results (from the past 12 hour(s))   CBC WITH 3 PART DIFF    Collection Time: 22  8:15 AM   Result Value Ref Range    WBC 10.8 4.5 - 13.0 K/uL    RBC 5.42 (H) 4.10 - 5.10 M/uL    HGB 14.6 12.0 - 16.0 g/dL    HCT 46.2 36 - 48 %    MCV 85.2 78 - 102 FL    MCH 26.9 25.0 - 35.0 PG    MCHC 31.6 31 - 37 g/dL    RDW 14.9 (H) 11.5 - 14.5 %    PLATELET 993 022 - 580 K/uL    NEUTROPHILS 80 (H) 40 - 70 %    Mixed cells 6 0.1 - 17 %    LYMPHOCYTES 14 14 - 44 %    ABS. NEUTROPHILS 8.7 1.8 - 9.5 K/UL    ABS. MIXED CELLS 0.6 0.0 - 2.3 K/uL    ABS. LYMPHOCYTES 1.5 1.1 - 5.9 K/UL    DF AUTOMATED         Hgb 14.6 and Hct 46.2 (Expected for diagnosis)     Therapeutic phlebotomy held per orders. Mr. Claudio Boyer tolerated well without complaints. Discharge instructions given, patient gave verbal understanding. Mr. Claudio Boyer was discharged from [place] [address] at 0830. He is to return on 22 at 0800 for his next appointment for labs and potential therapeutic phlebotomy.     Zamzam Scott RN  May 19, 2022

## 2022-06-16 ENCOUNTER — HOSPITAL ENCOUNTER (OUTPATIENT)
Dept: INFUSION THERAPY | Age: 62
Discharge: HOME | End: 2022-06-16
Payer: COMMERCIAL

## 2022-06-16 VITALS
TEMPERATURE: 97.5 F | RESPIRATION RATE: 18 BRPM | OXYGEN SATURATION: 99 % | HEART RATE: 86 BPM | DIASTOLIC BLOOD PRESSURE: 84 MMHG | SYSTOLIC BLOOD PRESSURE: 129 MMHG

## 2022-06-16 LAB
BASO+EOS+MONOS # BLD AUTO: 0.7 K/UL (ref 0–2.3)
BASO+EOS+MONOS NFR BLD AUTO: 8 % (ref 0.1–17)
DIFFERENTIAL METHOD BLD: ABNORMAL
ERYTHROCYTE [DISTWIDTH] IN BLOOD BY AUTOMATED COUNT: 15.2 % (ref 11.5–14.5)
HCT VFR BLD AUTO: 47.5 % (ref 36–48)
HGB BLD-MCNC: 14.9 G/DL (ref 12–16)
LYMPHOCYTES # BLD: 1.5 K/UL (ref 1.1–5.9)
LYMPHOCYTES NFR BLD: 17 % (ref 14–44)
MCH RBC QN AUTO: 26.8 PG (ref 25–35)
MCHC RBC AUTO-ENTMCNC: 31.4 G/DL (ref 31–37)
MCV RBC AUTO: 85.3 FL (ref 78–102)
NEUTS SEG # BLD: 7 K/UL (ref 1.8–9.5)
NEUTS SEG NFR BLD: 76 % (ref 40–70)
PLATELET # BLD AUTO: 298 K/UL (ref 140–440)
RBC # BLD AUTO: 5.57 M/UL (ref 4.1–5.1)
WBC # BLD AUTO: 9.2 K/UL (ref 4.5–13)

## 2022-06-16 PROCEDURE — 85025 COMPLETE CBC W/AUTO DIFF WBC: CPT

## 2022-06-16 PROCEDURE — 36415 COLL VENOUS BLD VENIPUNCTURE: CPT

## 2022-06-16 NOTE — PROGRESS NOTES
[facility] Progress Note    Date: 2022    Name: Katie Escobar    MRN: [id number]         : 1960      Mr. Naila Roberts arrived to [facility] at 0800  for labs to determine if he will require therapeutic phlebotomy. Pt denies any reactions or complications   Mr. Naila Roberts was assessed and education was provided. Mr. Hamida Jones vitals were reviewed. Visit Vitals  /84   Pulse 86   Temp 97.5 °F (36.4 °C)   Resp 18   SpO2 99%       Blood drawn for labs via Left  hand condition patent and no redness venipuncture x1 attempt using a 24 g butterfly needle , brisk blood return, covered with 2x2 and coban. Pt tolerated without complaint    Lab results were obtained and reviewed. Recent Results (from the past 12 hour(s))   CBC WITH 3 PART DIFF    Collection Time: 22  8:45 AM   Result Value Ref Range    WBC 9.2 4.5 - 13.0 K/uL    RBC 5.57 (H) 4.10 - 5.10 M/uL    HGB 14.9 12.0 - 16.0 g/dL    HCT 47.5 36 - 48 %    MCV 85.3 78 - 102 FL    MCH 26.8 25.0 - 35.0 PG    MCHC 31.4 31 - 37 g/dL    RDW 15.2 (H) 11.5 - 14.5 %    PLATELET 202 692 - 147 K/uL    NEUTROPHILS 76 (H) 40 - 70 %    Mixed cells 8 0.1 - 17 %    LYMPHOCYTES 17 14 - 44 %    ABS. NEUTROPHILS 7.0 1.8 - 9.5 K/UL    ABS. MIXED CELLS 0.7 0.0 - 2.3 K/uL    ABS. LYMPHOCYTES 1.5 1.1 - 5.9 K/UL    DF AUTOMATED         Hgb 14.9 and Hct 47.5 ( normal for diagnosis, this is why patient is being treated in clinic)     Therapeutic phlebotomy held per protocol. Mr. Naila Roberts was discharged from [place] [address] in stable condition at 0830 . He is to return on  at 0800 for his next appointment for labs and potential .therapeutic phlebotomy.     Merlin Roux, RN  2022

## 2022-07-15 ENCOUNTER — HOSPITAL ENCOUNTER (OUTPATIENT)
Dept: INFUSION THERAPY | Age: 62
Discharge: HOME | End: 2022-07-15
Payer: COMMERCIAL

## 2022-07-15 VITALS
DIASTOLIC BLOOD PRESSURE: 78 MMHG | SYSTOLIC BLOOD PRESSURE: 148 MMHG | HEART RATE: 62 BPM | TEMPERATURE: 97.4 F | RESPIRATION RATE: 18 BRPM

## 2022-07-15 LAB
BASO+EOS+MONOS # BLD AUTO: 0.7 K/UL (ref 0–2.3)
BASO+EOS+MONOS NFR BLD AUTO: 6 % (ref 0.1–17)
DIFFERENTIAL METHOD BLD: ABNORMAL
ERYTHROCYTE [DISTWIDTH] IN BLOOD BY AUTOMATED COUNT: 15.7 % (ref 11.5–14.5)
HCT VFR BLD AUTO: 48.8 % (ref 36–48)
HGB BLD-MCNC: 14.9 G/DL (ref 12–16)
LYMPHOCYTES # BLD: 1.8 K/UL (ref 1.1–5.9)
LYMPHOCYTES NFR BLD: 15 % (ref 14–44)
MCH RBC QN AUTO: 26.2 PG (ref 25–35)
MCHC RBC AUTO-ENTMCNC: 30.5 G/DL (ref 31–37)
MCV RBC AUTO: 85.8 FL (ref 78–102)
NEUTS SEG # BLD: 9.1 K/UL (ref 1.8–9.5)
NEUTS SEG NFR BLD: 79 % (ref 40–70)
PLATELET # BLD AUTO: 296 K/UL (ref 140–440)
RBC # BLD AUTO: 5.69 M/UL (ref 4.1–5.1)
WBC # BLD AUTO: 11.6 K/UL (ref 4.5–13)

## 2022-07-15 PROCEDURE — 36415 COLL VENOUS BLD VENIPUNCTURE: CPT

## 2022-07-15 PROCEDURE — 85025 COMPLETE CBC W/AUTO DIFF WBC: CPT

## 2022-07-15 NOTE — PROGRESS NOTES
[facility] Progress Note    Date: July 15, 2022    Name: Annie Beltran    MRN: [id number]         : 1960      Mr. Eun Greer arrived to [facility] at 0800  for labs to determine if he will require therapeutic phlebotomy. Pt denies any reactions or complications   Mr. Eun Greer was assessed and education was provided. Mr. Jeffery Solmoon vitals were reviewed. Visit Vitals  BP (!) 148/78   Pulse 62   Temp 97.4 °F (36.3 °C)   Resp 18       Blood drawn for labs via Left  hand condition patent and no redness venipuncture x1 attempt using a 24 g butterfly needle , brisk blood return, covered with 2x2 and coban. Pt tolerated without complaint    Lab results were obtained and reviewed. Recent Results (from the past 12 hour(s))   CBC WITH 3 PART DIFF    Collection Time: 07/15/22  8:00 AM   Result Value Ref Range    WBC 11.6 4.5 - 13.0 K/uL    RBC 5.69 (H) 4.10 - 5.10 M/uL    HGB 14.9 12.0 - 16.0 g/dL    HCT 48.8 (H) 36 - 48 %    MCV 85.8 78 - 102 FL    MCH 26.2 25.0 - 35.0 PG    MCHC 30.5 (L) 31 - 37 g/dL    RDW 15.7 (H) 11.5 - 14.5 %    PLATELET 924 674 - 658 K/uL    NEUTROPHILS 79 (H) 40 - 70 %    Mixed cells 6 0.1 - 17 %    LYMPHOCYTES 15 14 - 44 %    ABS. NEUTROPHILS 9.1 1.8 - 9.5 K/UL    ABS. MIXED CELLS 0.7 0.0 - 2.3 K/uL    ABS. LYMPHOCYTES 1.8 1.1 - 5.9 K/UL    DF AUTOMATED         Hgb 14.9 and Hct 48.8 ( normal for diagnosis, this is why patient is being treated in clinic)     Therapeutic phlebotomy held per protocol. Mr. Eun Greer was discharged from [place] [address] in stable condition at [address] [place] [address] . He is to return on 2022 at 0800 for his next appointment for labs and potential .therapeutic phlebotomy.     Keily Karimi RN  July 15, 2022

## 2022-07-21 ENCOUNTER — APPOINTMENT (OUTPATIENT)
Dept: INFUSION THERAPY | Age: 62
End: 2022-07-21
Payer: COMMERCIAL

## 2022-08-17 ENCOUNTER — HOSPITAL ENCOUNTER (OUTPATIENT)
Dept: INFUSION THERAPY | Age: 62
Discharge: HOME | End: 2022-08-17
Payer: COMMERCIAL

## 2022-08-17 VITALS
SYSTOLIC BLOOD PRESSURE: 132 MMHG | DIASTOLIC BLOOD PRESSURE: 81 MMHG | HEART RATE: 84 BPM | OXYGEN SATURATION: 97 % | TEMPERATURE: 98.2 F | RESPIRATION RATE: 16 BRPM

## 2022-08-17 LAB
BASOPHILS # BLD: 0.1 K/UL (ref 0–0.1)
BASOPHILS NFR BLD: 1 % (ref 0–2)
DIFFERENTIAL METHOD BLD: ABNORMAL
EOSINOPHIL # BLD: 0.1 K/UL (ref 0–0.4)
EOSINOPHIL NFR BLD: 1 % (ref 0–5)
ERYTHROCYTE [DISTWIDTH] IN BLOOD BY AUTOMATED COUNT: 17.3 % (ref 11.6–14.5)
HCT VFR BLD AUTO: 50.1 % (ref 36–48)
HGB BLD-MCNC: 15.7 G/DL (ref 13–16)
IMM GRANULOCYTES # BLD AUTO: 0.1 K/UL (ref 0–0.04)
IMM GRANULOCYTES NFR BLD AUTO: 1 % (ref 0–0.5)
LYMPHOCYTES # BLD: 2.6 K/UL (ref 0.9–3.6)
LYMPHOCYTES NFR BLD: 26 % (ref 21–52)
MCH RBC QN AUTO: 26.6 PG (ref 24–34)
MCHC RBC AUTO-ENTMCNC: 31.3 G/DL (ref 31–37)
MCV RBC AUTO: 84.9 FL (ref 78–100)
MONOCYTES # BLD: 0.8 K/UL (ref 0.05–1.2)
MONOCYTES NFR BLD: 8 % (ref 3–10)
NEUTS SEG # BLD: 6.3 K/UL (ref 1.8–8)
NEUTS SEG NFR BLD: 63 % (ref 40–73)
NRBC # BLD: 0 K/UL (ref 0–0.01)
NRBC BLD-RTO: 0 PER 100 WBC
PLATELET # BLD AUTO: 309 K/UL (ref 135–420)
PMV BLD AUTO: 9 FL (ref 9.2–11.8)
RBC # BLD AUTO: 5.9 M/UL (ref 4.35–5.65)
WBC # BLD AUTO: 9.9 K/UL (ref 4.6–13.2)

## 2022-08-17 PROCEDURE — 74011250636 HC RX REV CODE- 250/636: Performed by: FAMILY MEDICINE

## 2022-08-17 PROCEDURE — 85025 COMPLETE CBC W/AUTO DIFF WBC: CPT

## 2022-08-17 PROCEDURE — 99195 PHLEBOTOMY: CPT

## 2022-08-17 PROCEDURE — 74011000250 HC RX REV CODE- 250: Performed by: FAMILY MEDICINE

## 2022-08-17 RX ORDER — SODIUM CHLORIDE 9 MG/ML
500 INJECTION, SOLUTION INTRAVENOUS CONTINUOUS
Status: DISCONTINUED | OUTPATIENT
Start: 2022-08-17 | End: 2022-08-18 | Stop reason: HOSPADM

## 2022-08-17 RX ORDER — SODIUM CHLORIDE 0.9 % (FLUSH) 0.9 %
5-10 SYRINGE (ML) INJECTION AS NEEDED
Status: DISCONTINUED | OUTPATIENT
Start: 2022-08-17 | End: 2022-08-19 | Stop reason: HOSPADM

## 2022-08-17 RX ADMIN — SODIUM CHLORIDE, PRESERVATIVE FREE 10 ML: 5 INJECTION INTRAVENOUS at 10:54

## 2022-08-17 RX ADMIN — SODIUM CHLORIDE 500 ML: 0.9 INJECTION, SOLUTION INTRAVENOUS at 10:15

## 2022-08-17 NOTE — PROGRESS NOTES
[facility] Progress Note    Date: 2022    Name: Cezar Simpson    MRN: [id number]         : 1960      Therapeutic Phlebotomy    Mr. Margaret Cruz arrived to [facility] at 0800 for labs to determine if he will require therapeutic phlebotomy. Mr. Margaret Cruz was assessed and education was provided. Mr. Melinda Mckeon vitals were reviewed. Visit Vitals  /81 (BP 1 Location: Left arm, BP Patient Position: Sitting)   Pulse 84   Temp 98.2 °F (36.8 °C)   Resp 16   SpO2 97%       Blood drawn for labs via left hand condition patent and no redness venipuncture x1 attempt using a 20 G collection needle, brisk blood return, gauze and coban applied to site. Lab results were obtained and reviewed. Recent Results (from the past 12 hour(s))   CBC WITH AUTOMATED DIFF    Collection Time: 22  8:45 AM   Result Value Ref Range    WBC 9.9 4.6 - 13.2 K/uL    RBC 5.90 (H) 4.35 - 5.65 M/uL    HGB 15.7 13.0 - 16.0 g/dL    HCT 50.1 (H) 36.0 - 48.0 %    MCV 84.9 78.0 - 100.0 FL    MCH 26.6 24.0 - 34.0 PG    MCHC 31.3 31.0 - 37.0 g/dL    RDW 17.3 (H) 11.6 - 14.5 %    PLATELET 616 022 - 190 K/uL    MPV 9.0 (L) 9.2 - 11.8 FL    NRBC 0.0 0  WBC    ABSOLUTE NRBC 0.00 0.00 - 0.01 K/uL    NEUTROPHILS 63 40 - 73 %    LYMPHOCYTES 26 21 - 52 %    MONOCYTES 8 3 - 10 %    EOSINOPHILS 1 0 - 5 %    BASOPHILS 1 0 - 2 %    IMMATURE GRANULOCYTES 1 (H) 0.0 - 0.5 %    ABS. NEUTROPHILS 6.3 1.8 - 8.0 K/UL    ABS. LYMPHOCYTES 2.6 0.9 - 3.6 K/UL    ABS. MONOCYTES 0.8 0.05 - 1.2 K/UL    ABS. EOSINOPHILS 0.1 0.0 - 0.4 K/UL    ABS. BASOPHILS 0.1 0.0 - 0.1 K/UL    ABS. IMM. GRANS. 0.1 (H) 0.00 - 0.04 K/UL    DF AUTOMATED         Hgb 15.7 and Hct 50.1 (Expected for diagnosis)     Therapeutic phlebotomy initiated at 0855 per orders. Patient tolerating treatment well, therapeutic phlebotomy completed at 1008 with 500 ml of blood collected; Replaced with 500 ml of NS at 1015 and finished at 1052.  Patient offered snacks and drinks throughout the visit. Politely declined but stated he will eat after he leaves. Mr. Fabiola Mancera tolerated well without complaints. Discharge instructions given, patient gave verbal understanding. Mr. Fabiola Mancera was discharged from [place] [address] at 1055  He is to return on 08/26/22 at 0800 for his next appointment for labs and potential therapeutic phlebotomy.     Jarvis Camarillo RN  August 17, 2022

## 2022-08-26 ENCOUNTER — HOSPITAL ENCOUNTER (OUTPATIENT)
Dept: INFUSION THERAPY | Age: 62
Discharge: HOME | End: 2022-08-26
Payer: COMMERCIAL

## 2022-08-26 VITALS
OXYGEN SATURATION: 96 % | HEART RATE: 68 BPM | DIASTOLIC BLOOD PRESSURE: 81 MMHG | RESPIRATION RATE: 16 BRPM | TEMPERATURE: 97.9 F | SYSTOLIC BLOOD PRESSURE: 127 MMHG

## 2022-08-26 LAB
BASO+EOS+MONOS # BLD AUTO: 0.6 K/UL (ref 0–2.3)
BASO+EOS+MONOS NFR BLD AUTO: 6 % (ref 0.1–17)
DIFFERENTIAL METHOD BLD: ABNORMAL
ERYTHROCYTE [DISTWIDTH] IN BLOOD BY AUTOMATED COUNT: 15.8 % (ref 11.5–14.5)
HCT VFR BLD AUTO: 49 % (ref 36–48)
HGB BLD-MCNC: 14.8 G/DL (ref 12–16)
LYMPHOCYTES # BLD: 2.6 K/UL (ref 1.1–5.9)
LYMPHOCYTES NFR BLD: 26 % (ref 14–44)
MCH RBC QN AUTO: 26.1 PG (ref 25–35)
MCHC RBC AUTO-ENTMCNC: 30.2 G/DL (ref 31–37)
MCV RBC AUTO: 86.3 FL (ref 78–102)
NEUTS SEG # BLD: 7.1 K/UL (ref 1.8–9.5)
NEUTS SEG NFR BLD: 68 % (ref 40–70)
PLATELET # BLD AUTO: 296 K/UL (ref 140–440)
RBC # BLD AUTO: 5.68 M/UL (ref 4.1–5.1)
WBC # BLD AUTO: 10.3 K/UL (ref 4.5–13)

## 2022-08-26 PROCEDURE — 85025 COMPLETE CBC W/AUTO DIFF WBC: CPT

## 2022-08-26 PROCEDURE — 99195 PHLEBOTOMY: CPT

## 2022-08-26 PROCEDURE — 74011000250 HC RX REV CODE- 250: Performed by: FAMILY MEDICINE

## 2022-08-26 PROCEDURE — 74011250636 HC RX REV CODE- 250/636: Performed by: FAMILY MEDICINE

## 2022-08-26 RX ORDER — SODIUM CHLORIDE 9 MG/ML
500 INJECTION, SOLUTION INTRAVENOUS CONTINUOUS
Status: DISCONTINUED | OUTPATIENT
Start: 2022-08-26 | End: 2022-08-27 | Stop reason: HOSPADM

## 2022-08-26 RX ORDER — SODIUM CHLORIDE 0.9 % (FLUSH) 0.9 %
5-10 SYRINGE (ML) INJECTION AS NEEDED
Status: DISCONTINUED | OUTPATIENT
Start: 2022-08-26 | End: 2022-08-28 | Stop reason: HOSPADM

## 2022-08-26 RX ADMIN — SODIUM CHLORIDE 500 ML: 0.9 INJECTION, SOLUTION INTRAVENOUS at 10:15

## 2022-08-26 RX ADMIN — SODIUM CHLORIDE, PRESERVATIVE FREE 10 ML: 5 INJECTION INTRAVENOUS at 10:57

## 2022-08-26 NOTE — PROGRESS NOTES
[facility] Progress Note    Date: 2022    Name: Dianne Campuzano    MRN: [id number]         : 1960     Therapeutic Phlebotomy    Mr. Kourtney Carpenter arrived to [facility] at 0800 for labs to determine if he will require therapeutic phlebotomy. Mr. Kourtney Carpenter was assessed and education was provided. Mr. Milton Amador vitals were reviewed. Visit Vitals  /81 (BP 1 Location: Left arm, BP Patient Position: Sitting)   Pulse 68   Temp 97.9 °F (36.6 °C)   Resp 16   SpO2 96%       Blood drawn for labs via left hand condition patent and no redness venipuncture x 1 attempt using a 23 G butterfly needle. Removed intact and covered with gauze and elastic bandage. Lab results were obtained and reviewed. Recent Results (from the past 12 hour(s))   CBC WITH 3 PART DIFF    Collection Time: 22  8:15 AM   Result Value Ref Range    WBC 10.3 4.5 - 13.0 K/uL    RBC 5.68 (H) 4.10 - 5.10 M/uL    HGB 14.8 12.0 - 16.0 g/dL    HCT 49.0 (H) 36 - 48 %    MCV 86.3 78 - 102 FL    MCH 26.1 25.0 - 35.0 PG    MCHC 30.2 (L) 31 - 37 g/dL    RDW 15.8 (H) 11.5 - 14.5 %    PLATELET 830 621 - 135 K/uL    NEUTROPHILS 68 40 - 70 %    Mixed cells 6 0.1 - 17 %    LYMPHOCYTES 26 14 - 44 %    ABS. NEUTROPHILS 7.1 1.8 - 9.5 K/UL    ABS. MIXED CELLS 0.6 0.0 - 2.3 K/uL    ABS. LYMPHOCYTES 2.6 1.1 - 5.9 K/UL    DF AUTOMATED         Hgb 14.8 and Hct 49.0 (Expected for diagnosis)     Therapeutic phlebotomy initiated at 0850 per orders. Patient tolerating treatment well,  PIV PLACE TO RIGHT FOREARM AND THERAPEUTIC PHLEBOTOMY WAS INITIATED. 5 MINUTES INTO TREATMENT BLOOD FLOW STOPPED; A NEW PIV PLACE TO LEFT ANTICUBITAL WITH BRISK BLOOD FLOW; DRAINING FOR APPROXIMATELY 10 MINUTES AND STOPPED. IV replaced with  a 22 g was placed to right hand  after 4th and final attempt, and therapeutic phlebotomy was continued and completed at 1010 with 500 ml of blood collected; Replaced with 500 ml of NS at 1015 and finished at 1055.  Patient offered snacks and drinks throughout the visit. Politely declined but stated he will eat after he leaves. Mr. Leonard Lafleur tolerated well without complaints. Discharge instructions given, patient gave verbal understanding. Mr. Leonard Lafleur was discharged from [place] [address] at 1100. He is to return on 09/02/22 at 0800 for his next appointment for labs and potential therapeutic phlebotomy.     Lucian Pickard RN  August 26, 2022

## 2022-09-02 ENCOUNTER — HOSPITAL ENCOUNTER (OUTPATIENT)
Dept: INFUSION THERAPY | Age: 62
Discharge: HOME | End: 2022-09-02
Payer: COMMERCIAL

## 2022-09-02 VITALS
SYSTOLIC BLOOD PRESSURE: 148 MMHG | DIASTOLIC BLOOD PRESSURE: 88 MMHG | TEMPERATURE: 98.4 F | RESPIRATION RATE: 16 BRPM | HEART RATE: 62 BPM | OXYGEN SATURATION: 96 %

## 2022-09-02 LAB
BASO+EOS+MONOS # BLD AUTO: 0.7 K/UL (ref 0–2.3)
BASO+EOS+MONOS NFR BLD AUTO: 7 % (ref 0.1–17)
DIFFERENTIAL METHOD BLD: ABNORMAL
ERYTHROCYTE [DISTWIDTH] IN BLOOD BY AUTOMATED COUNT: 15.6 % (ref 11.5–14.5)
HCT VFR BLD AUTO: 47.1 % (ref 36–48)
HGB BLD-MCNC: 14.4 G/DL (ref 12–16)
LYMPHOCYTES # BLD: 2.7 K/UL (ref 1.1–5.9)
LYMPHOCYTES NFR BLD: 28 % (ref 14–44)
MCH RBC QN AUTO: 26.6 PG (ref 25–35)
MCHC RBC AUTO-ENTMCNC: 30.6 G/DL (ref 31–37)
MCV RBC AUTO: 87.1 FL (ref 78–102)
NEUTS SEG # BLD: 6.3 K/UL (ref 1.8–9.5)
NEUTS SEG NFR BLD: 65 % (ref 40–70)
PLATELET # BLD AUTO: 302 K/UL (ref 140–440)
RBC # BLD AUTO: 5.41 M/UL (ref 4.1–5.1)
WBC # BLD AUTO: 9.7 K/UL (ref 4.5–13)

## 2022-09-02 PROCEDURE — 36415 COLL VENOUS BLD VENIPUNCTURE: CPT

## 2022-09-02 PROCEDURE — 85025 COMPLETE CBC W/AUTO DIFF WBC: CPT

## 2022-09-02 NOTE — PROGRESS NOTES
[facility] Progress Note    Date: 2022    Name: Karlo Brown    MRN: [id number]         : 1960      Mr. Angela Mijares arrived to [place] at 0800  for labs to determine if he will require therapeutic phlebotomy. Pt denies any reactions or complications   Mr. Angela Mijares was assessed and education was provided. Mr. Mary Beth Valera vitals were reviewed. Visit Vitals  BP (!) 148/88 (BP 1 Location: Left arm, BP Patient Position: Sitting)   Pulse 62   Temp 98.4 °F (36.9 °C)   Resp 16   SpO2 96%       Blood drawn for labs via Left  hand condition patent and no redness venipuncture x1 attempt using a 24 g butterfly needle , brisk blood return, covered with 2x2 and coban. Pt tolerated without complaint    Lab results were obtained and reviewed. Recent Results (from the past 12 hour(s))   CBC WITH 3 PART DIFF    Collection Time: 22  8:15 AM   Result Value Ref Range    WBC 9.7 4.5 - 13.0 K/uL    RBC 5.41 (H) 4.10 - 5.10 M/uL    HGB 14.4 12.0 - 16.0 g/dL    HCT 47.1 36 - 48 %    MCV 87.1 78 - 102 FL    MCH 26.6 25.0 - 35.0 PG    MCHC 30.6 (L) 31 - 37 g/dL    RDW 15.6 (H) 11.5 - 14.5 %    PLATELET 626 076 - 261 K/uL    NEUTROPHILS 65 40 - 70 %    Mixed cells 7 0.1 - 17 %    LYMPHOCYTES 28 14 - 44 %    ABS. NEUTROPHILS 6.3 1.8 - 9.5 K/UL    ABS. MIXED CELLS 0.7 0.0 - 2.3 K/uL    ABS. LYMPHOCYTES 2.7 1.1 - 5.9 K/UL    DF AUTOMATED         Hgb 14.4 and Hct 47.1 ( normal for diagnosis, this is why patient is being treated in clinic)     Therapeutic phlebotomy held per protocol. Mr. Angela Mijares was discharged from [place] [address] in stable condition at [address] [place] [address]. He is to return on 10/07/2022 at 0800 for his next appointment for labs and potential .therapeutic phlebotomy.     Yamilet Nichols RN  2022

## 2022-10-07 ENCOUNTER — HOSPITAL ENCOUNTER (OUTPATIENT)
Dept: INFUSION THERAPY | Age: 62
Discharge: HOME | End: 2022-10-07
Payer: COMMERCIAL

## 2022-10-07 VITALS
HEART RATE: 76 BPM | RESPIRATION RATE: 16 BRPM | DIASTOLIC BLOOD PRESSURE: 81 MMHG | OXYGEN SATURATION: 95 % | TEMPERATURE: 98.7 F | SYSTOLIC BLOOD PRESSURE: 134 MMHG

## 2022-10-07 LAB
BASO+EOS+MONOS # BLD AUTO: 0.7 K/UL (ref 0–2.3)
BASO+EOS+MONOS NFR BLD AUTO: 8 % (ref 0.1–17)
DIFFERENTIAL METHOD BLD: ABNORMAL
ERYTHROCYTE [DISTWIDTH] IN BLOOD BY AUTOMATED COUNT: 14.7 % (ref 11.5–14.5)
HCT VFR BLD AUTO: 47.7 % (ref 36–48)
HGB BLD-MCNC: 14.3 G/DL (ref 12–16)
LYMPHOCYTES # BLD: 2.3 K/UL (ref 1.1–5.9)
LYMPHOCYTES NFR BLD: 26 % (ref 14–44)
MCH RBC QN AUTO: 26 PG (ref 25–35)
MCHC RBC AUTO-ENTMCNC: 30 G/DL (ref 31–37)
MCV RBC AUTO: 86.7 FL (ref 78–102)
NEUTS SEG # BLD: 6 K/UL (ref 1.8–9.5)
NEUTS SEG NFR BLD: 66 % (ref 40–70)
PLATELET # BLD AUTO: 295 K/UL (ref 140–440)
RBC # BLD AUTO: 5.5 M/UL (ref 4.1–5.1)
WBC # BLD AUTO: 9 K/UL (ref 4.5–13)

## 2022-10-07 PROCEDURE — 85025 COMPLETE CBC W/AUTO DIFF WBC: CPT

## 2022-10-07 PROCEDURE — 36415 COLL VENOUS BLD VENIPUNCTURE: CPT

## 2022-11-04 ENCOUNTER — HOSPITAL ENCOUNTER (OUTPATIENT)
Dept: INFUSION THERAPY | Age: 62
Discharge: HOME | End: 2022-11-04
Payer: COMMERCIAL

## 2022-11-04 VITALS
SYSTOLIC BLOOD PRESSURE: 140 MMHG | RESPIRATION RATE: 18 BRPM | HEART RATE: 58 BPM | OXYGEN SATURATION: 97 % | TEMPERATURE: 97.7 F | DIASTOLIC BLOOD PRESSURE: 80 MMHG

## 2022-11-04 LAB
BASO+EOS+MONOS # BLD AUTO: 1.1 K/UL (ref 0–2.3)
BASO+EOS+MONOS NFR BLD AUTO: 12 % (ref 0.1–17)
DIFFERENTIAL METHOD BLD: ABNORMAL
ERYTHROCYTE [DISTWIDTH] IN BLOOD BY AUTOMATED COUNT: 14.9 % (ref 11.5–14.5)
HCT VFR BLD AUTO: 47.6 % (ref 36–48)
HGB BLD-MCNC: 14.5 G/DL (ref 12–16)
LYMPHOCYTES # BLD: 1.8 K/UL (ref 1.1–5.9)
LYMPHOCYTES NFR BLD: 19 % (ref 14–44)
MCH RBC QN AUTO: 26.5 PG (ref 25–35)
MCHC RBC AUTO-ENTMCNC: 30.5 G/DL (ref 31–37)
MCV RBC AUTO: 86.9 FL (ref 78–102)
NEUTS SEG # BLD: 6.6 K/UL (ref 1.8–9.5)
NEUTS SEG NFR BLD: 70 % (ref 40–70)
PLATELET # BLD AUTO: 264 K/UL (ref 140–440)
RBC # BLD AUTO: 5.48 M/UL (ref 4.1–5.1)
WBC # BLD AUTO: 9.5 K/UL (ref 4.5–13)

## 2022-11-04 PROCEDURE — 85025 COMPLETE CBC W/AUTO DIFF WBC: CPT

## 2022-11-04 PROCEDURE — 36415 COLL VENOUS BLD VENIPUNCTURE: CPT

## 2022-11-04 NOTE — PROGRESS NOTES
[facility] Progress Note    Date: 2022    Name: Morales Renee    MRN: [id number]         : 1960      Therapeutic Phlebotomy/POC LABS    Mr. Shameka Alex arrived to [facility] at 0800 for labs to determine if he will require therapeutic phlebotomy. Pt denies any reactions or complications from last weeks therapeutic phlebotomy. Mr. Shameka Alex was assessed and education was provided. Mr. Francis Goodson vitals were reviewed. Visit Vitals  BP (!) 140/80 (BP 1 Location: Right arm, BP Patient Position: Sitting)   Pulse (!) 58   Temp 97.7 °F (36.5 °C)   Resp 18   SpO2 97%       Blood drawn for labs via right hand condition patent and no redness venipuncture x1 attempt using a 23g collection needle, brisk blood return, applied Band-Aid to site. Lab results were obtained and reviewed. Recent Results (from the past 12 hour(s))   CBC WITH 3 PART DIFF    Collection Time: 22  8:30 AM   Result Value Ref Range    WBC 9.5 4.5 - 13.0 K/uL    RBC 5.48 (H) 4.10 - 5.10 M/uL    HGB 14.5 12.0 - 16.0 g/dL    HCT 47.6 36 - 48 %    MCV 86.9 78 - 102 FL    MCH 26.5 25.0 - 35.0 PG    MCHC 30.5 (L) 31 - 37 g/dL    RDW 14.9 (H) 11.5 - 14.5 %    PLATELET 021 459 - 141 K/uL    NEUTROPHILS 70 40 - 70 %    Mixed cells 12 0.1 - 17 %    LYMPHOCYTES 19 14 - 44 %    ABS. NEUTROPHILS 6.6 1.8 - 9.5 K/UL    ABS. MIXED CELLS 1.1 0.0 - 2.3 K/uL    ABS. LYMPHOCYTES 1.8 1.1 - 5.9 K/UL    DF AUTOMATED         Hgb 14.5 and Hct 47.6 (expected for diagnosis, this is why patient is being treated in clinic). Therapeutic phlebotomy held per orders. Mr. Shameka Alex tolerated well without complaints. Discharge instructions given, patient gave verbal understanding. Mr. Shameka Alex was discharged from [place] [address] at 2905. He is to return on 12/15/22 at 0800 for his next appointment for labs and potential therapeutic phlebotomy.     Ashley Monroe RN  2022

## 2022-12-15 ENCOUNTER — HOSPITAL ENCOUNTER (OUTPATIENT)
Dept: INFUSION THERAPY | Age: 62
Discharge: HOME | End: 2022-12-15
Payer: COMMERCIAL

## 2022-12-15 VITALS
RESPIRATION RATE: 18 BRPM | HEART RATE: 68 BPM | DIASTOLIC BLOOD PRESSURE: 85 MMHG | OXYGEN SATURATION: 99 % | SYSTOLIC BLOOD PRESSURE: 131 MMHG | TEMPERATURE: 97.8 F

## 2022-12-15 LAB
BASO+EOS+MONOS # BLD AUTO: 0.6 K/UL (ref 0–2.3)
BASO+EOS+MONOS NFR BLD AUTO: 7 % (ref 0.1–17)
DIFFERENTIAL METHOD BLD: ABNORMAL
ERYTHROCYTE [DISTWIDTH] IN BLOOD BY AUTOMATED COUNT: 14.8 % (ref 11.5–14.5)
HCT VFR BLD AUTO: 47.3 % (ref 36–48)
HGB BLD-MCNC: 14.4 G/DL (ref 12–16)
LYMPHOCYTES # BLD: 1.9 K/UL (ref 1.1–5.9)
LYMPHOCYTES NFR BLD: 24 % (ref 14–44)
MCH RBC QN AUTO: 26.3 PG (ref 25–35)
MCHC RBC AUTO-ENTMCNC: 30.4 G/DL (ref 31–37)
MCV RBC AUTO: 86.5 FL (ref 78–102)
NEUTS SEG # BLD: 5.5 K/UL (ref 1.8–9.5)
NEUTS SEG NFR BLD: 69 % (ref 40–70)
PLATELET # BLD AUTO: 283 K/UL (ref 140–440)
RBC # BLD AUTO: 5.47 M/UL (ref 4.1–5.1)
WBC # BLD AUTO: 8 K/UL (ref 4.5–13)

## 2022-12-15 PROCEDURE — 85025 COMPLETE CBC W/AUTO DIFF WBC: CPT

## 2022-12-15 PROCEDURE — 36415 COLL VENOUS BLD VENIPUNCTURE: CPT

## 2022-12-15 NOTE — PROGRESS NOTES
[facility] Progress Note    Date: December 15, 2022    Name: Lexi Willams    MRN: [id number]         : 1960      Therapeutic Phlebotomy/POC LABS    Mr. María Vang arrived to [facility] at 1658 for labs to determine if he will require therapeutic phlebotomy. Pt denies any reactions or complications from last weeks therapeutic phlebotomy. Mr. María Vang was assessed and education was provided. Mr. Quince Kayser vitals were reviewed. Visit Vitals  /85 (BP 1 Location: Right arm, BP Patient Position: Sitting)   Pulse 68   Temp 97.8 °F (36.6 °C)   Resp 18   SpO2 99%       Blood drawn for labs via left hand condition patent and no redness venipuncture x1 attempt using a 23g collection needle, brisk blood return, applied gauze and coban to site. Lab results were obtained and reviewed. Recent Results (from the past 12 hour(s))   CBC WITH 3 PART DIFF    Collection Time: 12/15/22  8:15 AM   Result Value Ref Range    WBC 8.0 4.5 - 13.0 K/uL    RBC 5.47 (H) 4.10 - 5.10 M/uL    HGB 14.4 12.0 - 16.0 g/dL    HCT 47.3 36 - 48 %    MCV 86.5 78 - 102 FL    MCH 26.3 25.0 - 35.0 PG    MCHC 30.4 (L) 31 - 37 g/dL    RDW 14.8 (H) 11.5 - 14.5 %    PLATELET 697 509 - 166 K/uL    NEUTROPHILS 69 40 - 70 %    Mixed cells 7 0.1 - 17 %    LYMPHOCYTES 24 14 - 44 %    ABS. NEUTROPHILS 5.5 1.8 - 9.5 K/UL    ABS. MIXED CELLS 0.6 0.0 - 2.3 K/uL    ABS. LYMPHOCYTES 1.9 1.1 - 5.9 K/UL    DF AUTOMATED         Hgb 14.4 and Hct 47.3 (expected for diagnosis, this is why patient is being treated in clinic). Therapeutic phlebotomy held per orders. Mr. María Vang tolerated well without complaints. Discharge instructions given, patient gave verbal understanding. Mr. María Vang was discharged from [place] [address] at 0830. He is to return on 23 at 0800 for his next appointment for labs and potential therapeutic phlebotomy.     Unknown IVETT Ferreira  December 15, 2022

## 2023-01-12 ENCOUNTER — APPOINTMENT (OUTPATIENT)
Dept: INFUSION THERAPY | Age: 63
End: 2023-01-12
Payer: COMMERCIAL

## 2023-01-13 ENCOUNTER — HOSPITAL ENCOUNTER (OUTPATIENT)
Dept: INFUSION THERAPY | Age: 63
Discharge: HOME | End: 2023-01-13
Payer: COMMERCIAL

## 2023-01-13 VITALS
RESPIRATION RATE: 18 BRPM | DIASTOLIC BLOOD PRESSURE: 77 MMHG | OXYGEN SATURATION: 97 % | TEMPERATURE: 98.4 F | HEART RATE: 67 BPM | SYSTOLIC BLOOD PRESSURE: 139 MMHG

## 2023-01-13 LAB
BASOPHILS # BLD: 0.1 K/UL (ref 0–0.1)
BASOPHILS NFR BLD: 1 % (ref 0–2)
DIFFERENTIAL METHOD BLD: ABNORMAL
EOSINOPHIL # BLD: 0.1 K/UL (ref 0–0.4)
EOSINOPHIL NFR BLD: 2 % (ref 0–5)
ERYTHROCYTE [DISTWIDTH] IN BLOOD BY AUTOMATED COUNT: 16.1 % (ref 11.6–14.5)
HCT VFR BLD AUTO: 48.5 % (ref 36–48)
HGB BLD-MCNC: 15.2 G/DL (ref 13–16)
IMM GRANULOCYTES # BLD AUTO: 0 K/UL (ref 0–0.04)
IMM GRANULOCYTES NFR BLD AUTO: 0 % (ref 0–0.5)
LYMPHOCYTES # BLD: 1.3 K/UL (ref 0.9–3.6)
LYMPHOCYTES NFR BLD: 16 % (ref 21–52)
MCH RBC QN AUTO: 26.3 PG (ref 24–34)
MCHC RBC AUTO-ENTMCNC: 31.3 G/DL (ref 31–37)
MCV RBC AUTO: 83.9 FL (ref 78–100)
MONOCYTES # BLD: 0.6 K/UL (ref 0.05–1.2)
MONOCYTES NFR BLD: 7 % (ref 3–10)
NEUTS SEG # BLD: 6.1 K/UL (ref 1.8–8)
NEUTS SEG NFR BLD: 74 % (ref 40–73)
NRBC # BLD: 0 K/UL (ref 0–0.01)
NRBC BLD-RTO: 0 PER 100 WBC
PLATELET # BLD AUTO: 295 K/UL (ref 135–420)
PMV BLD AUTO: 9.3 FL (ref 9.2–11.8)
RBC # BLD AUTO: 5.78 M/UL (ref 4.35–5.65)
WBC # BLD AUTO: 8.2 K/UL (ref 4.6–13.2)

## 2023-01-13 PROCEDURE — 85025 COMPLETE CBC W/AUTO DIFF WBC: CPT

## 2023-01-13 PROCEDURE — 36415 COLL VENOUS BLD VENIPUNCTURE: CPT

## 2023-01-13 NOTE — PROGRESS NOTES
[facility] Progress Note    Date: 2023    Name: Raghavendra               MRN: [id number]              : 1960      Therapeutic Phlebotomy/POC LABS    Mr. Izabela Quintana was assessed and education was provided. Mr. Yamilex Sandoval vitals were reviewed. Visit Vitals  /77 (BP 1 Location: Right arm, BP Patient Position: Sitting)   Pulse 67   Temp 98.4 °F (36.9 °C)   Resp 18   SpO2 97%     Blood drawn for labs via left hand condition patent and no redness venipuncture x1 attempt using a 23g collection needle, brisk blood return, applied gauze and coban to site. Hgb 15.2 and Hct 48.6 (expected for diagnosis, this is why patient is being treated in clinic). Therapeutic phlebotomy held per orders. Mr. Izabela Quintana tolerated well without complaints. Discharge instructions given, patient gave verbal understanding    Mr. Izabela Quintana was discharged from [place] [address] in stable condition at 0830. He is to return on 2/10/23 at 0800 for his next appointment.     Moses Miller RN  2023  8:37 AM

## 2023-02-10 ENCOUNTER — HOSPITAL ENCOUNTER (OUTPATIENT)
Dept: INFUSION THERAPY | Age: 63
Discharge: HOME | End: 2023-02-10
Payer: COMMERCIAL

## 2023-02-10 VITALS
RESPIRATION RATE: 18 BRPM | HEART RATE: 63 BPM | SYSTOLIC BLOOD PRESSURE: 143 MMHG | TEMPERATURE: 98.8 F | OXYGEN SATURATION: 96 % | DIASTOLIC BLOOD PRESSURE: 77 MMHG

## 2023-02-10 LAB
BASO+EOS+MONOS # BLD AUTO: 0.4 K/UL (ref 0–2.3)
BASO+EOS+MONOS NFR BLD AUTO: 6 % (ref 0.1–17)
DIFFERENTIAL METHOD BLD: ABNORMAL
ERYTHROCYTE [DISTWIDTH] IN BLOOD BY AUTOMATED COUNT: 15.3 % (ref 11.5–14.5)
HCT VFR BLD AUTO: 48.1 % (ref 36–48)
HGB BLD-MCNC: 14.9 G/DL (ref 12–16)
LYMPHOCYTES # BLD: 1.5 K/UL (ref 1.1–5.9)
LYMPHOCYTES NFR BLD: 20 % (ref 14–44)
MCH RBC QN AUTO: 26.5 PG (ref 25–35)
MCHC RBC AUTO-ENTMCNC: 31 G/DL (ref 31–37)
MCV RBC AUTO: 85.4 FL (ref 78–102)
NEUTS SEG # BLD: 5.6 K/UL (ref 1.8–9.5)
NEUTS SEG NFR BLD: 75 % (ref 40–70)
PLATELET # BLD AUTO: 268 K/UL (ref 140–440)
RBC # BLD AUTO: 5.63 M/UL (ref 4.1–5.1)
WBC # BLD AUTO: 7.5 K/UL (ref 4.5–13)

## 2023-02-10 PROCEDURE — 36415 COLL VENOUS BLD VENIPUNCTURE: CPT

## 2023-02-10 PROCEDURE — 85025 COMPLETE CBC W/AUTO DIFF WBC: CPT

## 2023-02-10 NOTE — PROGRESS NOTES
[facility] Progress Note    Date: February 10, 2023    Name: Glory Ch              MRN: [id number]              : 1960    Therapeutic Phlebotomy (HELD)/POC LABS      Mr. Suman Johnson was assessed and education was provided. Mr. Lino Aguilera vitals were reviewed. Visit Vitals  BP (!) 143/77 (BP 1 Location: Right arm, BP Patient Position: Sitting)   Pulse 63   Temp 98.8 °F (37.1 °C)   Resp 18   SpO2 96%       Lab results were obtained and reviewed. Recent Results (from the past 12 hour(s))   CBC WITH 3 PART DIFF    Collection Time: 02/10/23  8:10 AM   Result Value Ref Range    WBC 7.5 4.5 - 13.0 K/uL    RBC 5.63 (H) 4.10 - 5.10 M/uL    HGB 14.9 12.0 - 16.0 g/dL    HCT 48.1 (H) 36 - 48 %    MCV 85.4 78 - 102 FL    MCH 26.5 25.0 - 35.0 PG    MCHC 31.0 31 - 37 g/dL    RDW 15.3 (H) 11.5 - 14.5 %    PLATELET 719 374 - 420 K/uL    NEUTROPHILS 75 (H) 40 - 70 %    Mixed cells 6 0.1 - 17 %    LYMPHOCYTES 20 14 - 44 %    ABS. NEUTROPHILS 5.6 1.8 - 9.5 K/UL    ABS. MIXED CELLS 0.4 0.0 - 2.3 K/uL    ABS. LYMPHOCYTES 1.5 1.1 - 5.9 K/UL    DF AUTOMATED       Blood drawn for labs via right hand condition patent and no redness venipuncture x1 attempt using a 23g collection needle, brisk blood return, applied gauze and coban to site. Hgb 14.9 and Hct 48. 1(expected for diagnosis, this is why patient is being treated in clinic). Therapeutic phlebotomy held per orders. Mr. Suman Johnson tolerated well without complaints. Discharge instructions given, patient gave verbal understanding    Armband removed and shredded. Mr. Suman Johnson was discharged from [place] [address] in stable condition at [address] [place] [address]. He is to return on 3/10/23 at 0800 for his next appointment.     Kinga Snow RN  February 10, 2023  8:32 AM

## 2023-03-10 ENCOUNTER — HOSPITAL ENCOUNTER (OUTPATIENT)
Facility: HOSPITAL | Age: 63
Setting detail: INFUSION SERIES
End: 2023-03-10
Payer: COMMERCIAL

## 2023-03-10 VITALS
OXYGEN SATURATION: 100 % | DIASTOLIC BLOOD PRESSURE: 84 MMHG | RESPIRATION RATE: 16 BRPM | TEMPERATURE: 98.3 F | SYSTOLIC BLOOD PRESSURE: 146 MMHG | HEART RATE: 63 BPM

## 2023-03-10 LAB
BASO+EOS+MONOS # BLD AUTO: 0.6 K/UL (ref 0–2.3)
BASO+EOS+MONOS NFR BLD AUTO: 7 % (ref 0.1–17)
DIFFERENTIAL METHOD BLD: ABNORMAL
ERYTHROCYTE [DISTWIDTH] IN BLOOD BY AUTOMATED COUNT: 15.6 % (ref 11.5–14.5)
HCT VFR BLD AUTO: 48.7 % (ref 36–48)
HGB BLD-MCNC: 15.2 G/DL (ref 12–16)
LYMPHOCYTES # BLD: 1.2 K/UL (ref 1.1–5.9)
LYMPHOCYTES NFR BLD: 14 % (ref 14–44)
MCH RBC QN AUTO: 26.8 PG (ref 25–35)
MCHC RBC AUTO-ENTMCNC: 31.2 G/DL (ref 31–37)
MCV RBC AUTO: 85.9 FL (ref 78–102)
NEUTS SEG # BLD: 6.7 K/UL (ref 1.8–9.5)
NEUTS SEG NFR BLD: 79 % (ref 40–70)
PLATELET # BLD AUTO: 266 K/UL (ref 140–440)
RBC # BLD AUTO: 5.67 M/UL (ref 4.1–5.1)
WBC # BLD AUTO: 8.5 K/UL (ref 4.5–13)

## 2023-03-10 PROCEDURE — 36415 COLL VENOUS BLD VENIPUNCTURE: CPT

## 2023-03-10 PROCEDURE — 85025 COMPLETE CBC W/AUTO DIFF WBC: CPT

## 2023-03-10 NOTE — PROGRESS NOTES
[facility] Progress Note    Date: March 10, 2023    Name: Vivien Lobo              MRN: [id number]              : 1960     Therapeutic Phlebotomy (HELD)/POC LABS      Mr. Narcisa Mancia was assessed and education was provided. Mr. Suresh Peñaloza vitals were reviewed. Vitals:    03/10/23 0900   BP: (!) 146/84   Pulse: 63   Resp: 16   Temp: 98.3 °F (36.8 °C)   SpO2: 100%       Lab results were obtained and reviewed. Recent Results (from the past 12 hour(s))   CBC with Partial Differential    Collection Time: 03/10/23  9:05 AM   Result Value Ref Range    WBC 8.5 4.5 - 13.0 K/uL    RBC 5.67 (H) 4.10 - 5.10 M/uL    Hemoglobin 15.2 12.0 - 16.0 g/dL    Hematocrit 48.7 (H) 36 - 48 %    MCV 85.9 78 - 102 FL    MCH 26.8 25.0 - 35.0 PG    MCHC 31.2 31 - 37 g/dL    RDW 15.6 (H) 11.5 - 14.5 %    Platelets 678 698 - 615 K/uL    Seg Neutrophils 79 (H) 40 - 70 %    Mixed Cells 7 0.1 - 17 %    Lymphocytes 14 14 - 44 %    Segs Absolute 6.7 1.8 - 9.5 K/UL    ABSOLUTE MIXED CELLS 0.6 0.0 - 2.3 K/uL    Absolute Lymph # 1.2 1.1 - 5.9 K/UL    Differential Type AUTOMATED        Blood drawn for labs via right hand condition patent and no redness venipuncture x1 attempt using a 23g collection needle, brisk blood return,  applied gauze and coban to site. Hgb 15.2 and  Hct 48. 7(expected for diagnosis, this is why patient is being treated in clinic). Therapeutic phlebotomy held per orders. Mr. Narcisa Mancia tolerated well without complaints. Discharge instructions given, patient gave verbal understanding      Armband removed and shredded    Mr. Narcisa Mancia was discharged from [place] [address] in stable condition at [address] [place] [address]. He is to return on 4/10/23 at 0800 for his next appointment.     Jarrett Rojo RN  March 10, 2023  9:18 AM

## 2023-04-07 ENCOUNTER — HOSPITAL ENCOUNTER (OUTPATIENT)
Facility: HOSPITAL | Age: 63
Setting detail: INFUSION SERIES
End: 2023-04-07
Payer: COMMERCIAL

## 2023-04-07 VITALS
OXYGEN SATURATION: 100 % | TEMPERATURE: 97.8 F | RESPIRATION RATE: 16 BRPM | SYSTOLIC BLOOD PRESSURE: 149 MMHG | DIASTOLIC BLOOD PRESSURE: 79 MMHG | HEART RATE: 66 BPM

## 2023-04-07 LAB
BASO+EOS+MONOS # BLD AUTO: 0.7 K/UL (ref 0–2.3)
BASO+EOS+MONOS NFR BLD AUTO: 7 % (ref 0.1–17)
DIFFERENTIAL METHOD BLD: ABNORMAL
ERYTHROCYTE [DISTWIDTH] IN BLOOD BY AUTOMATED COUNT: 15.4 % (ref 11.5–14.5)
HCT VFR BLD AUTO: 48.4 % (ref 36–48)
HGB BLD-MCNC: 15.1 G/DL (ref 12–16)
LYMPHOCYTES # BLD: 2.8 K/UL (ref 1.1–5.9)
LYMPHOCYTES NFR BLD: 25 % (ref 14–44)
MCH RBC QN AUTO: 27.2 PG (ref 25–35)
MCHC RBC AUTO-ENTMCNC: 31.2 G/DL (ref 31–37)
MCV RBC AUTO: 87.1 FL (ref 78–102)
NEUTS SEG # BLD: 7.6 K/UL (ref 1.8–9.5)
NEUTS SEG NFR BLD: 69 % (ref 40–70)
PLATELET # BLD AUTO: 287 K/UL (ref 140–440)
RBC # BLD AUTO: 5.56 M/UL (ref 4.1–5.1)
WBC # BLD AUTO: 11.1 K/UL (ref 4.5–13)

## 2023-04-07 PROCEDURE — 36415 COLL VENOUS BLD VENIPUNCTURE: CPT

## 2023-04-07 PROCEDURE — 85025 COMPLETE CBC W/AUTO DIFF WBC: CPT

## 2023-04-07 NOTE — PROGRESS NOTES
[facility] Progress Note    Date: 2023    Name: Aliya Castellanos              MRN: [id number]              : 1960    Therapeutic Phlebotomy (HELD)/POC LABS      Mr. Ceasar Hernandez was assessed and education was provided. Mr. Darlyn Angeles vitals were reviewed. Vitals:    23 0800   BP: (!) 149/79   Pulse: 66   Resp: 16   Temp: 97.8 °F (36.6 °C)   SpO2: 100%       Lab results were obtained and reviewed. Recent Results (from the past 12 hour(s))   CBC with Partial Differential    Collection Time: 23  8:15 AM   Result Value Ref Range    WBC 11.1 4.5 - 13.0 K/uL    RBC 5.56 (H) 4.10 - 5.10 M/uL    Hemoglobin 15.1 12.0 - 16.0 g/dL    Hematocrit 48.4 (H) 36 - 48 %    MCV 87.1 78 - 102 FL    MCH 27.2 25.0 - 35.0 PG    MCHC 31.2 31 - 37 g/dL    RDW 15.4 (H) 11.5 - 14.5 %    Platelets 374 357 - 338 K/uL    Seg Neutrophils 69 40 - 70 %    Mixed Cells 7 0.1 - 17 %    Lymphocytes 25 14 - 44 %    Segs Absolute 7.6 1.8 - 9.5 K/UL    ABSOLUTE MIXED CELLS 0.7 0.0 - 2.3 K/uL    Absolute Lymph # 2.8 1.1 - 5.9 K/UL    Differential Type AUTOMATED       Blood drawn for labs via right hand condition patent and no redness venipuncture x1 attempt using a 23g collection needle, brisk blood return,  applied gauze and coban to site. Hgb 15.1 and  Hct 48. 4(expected for diagnosis, this is why patient is being treated in clinic). Therapeutic phlebotomy held per orders. Mr. Ceasar Hernandez tolerated well without complaints. Discharge instructions given, patient gave verbal understanding      Armband removed and shredded    Mr. Ceasar Hernandez was discharged from [place] [address] in stable condition at [address] [place] [address]. He is to return on 23 at 0800 for his next appointment.     Frederick Cortez RN  2023  4:00 PM

## 2023-05-05 ENCOUNTER — APPOINTMENT (OUTPATIENT)
Facility: HOSPITAL | Age: 63
End: 2023-05-05
Payer: COMMERCIAL

## 2023-05-12 ENCOUNTER — HOSPITAL ENCOUNTER (OUTPATIENT)
Facility: HOSPITAL | Age: 63
Setting detail: INFUSION SERIES
End: 2023-05-12
Payer: COMMERCIAL

## 2023-05-12 VITALS
RESPIRATION RATE: 16 BRPM | HEART RATE: 72 BPM | DIASTOLIC BLOOD PRESSURE: 78 MMHG | SYSTOLIC BLOOD PRESSURE: 153 MMHG | OXYGEN SATURATION: 96 % | TEMPERATURE: 98.3 F

## 2023-05-12 LAB
BASO+EOS+MONOS # BLD AUTO: 0.5 K/UL (ref 0–2.3)
BASO+EOS+MONOS NFR BLD AUTO: 5 % (ref 0.1–17)
DIFFERENTIAL METHOD BLD: ABNORMAL
ERYTHROCYTE [DISTWIDTH] IN BLOOD BY AUTOMATED COUNT: 14.8 % (ref 11.5–14.5)
HCT VFR BLD AUTO: 51.3 % (ref 36–48)
HGB BLD-MCNC: 15.6 G/DL (ref 12–16)
LYMPHOCYTES # BLD: 0.9 K/UL (ref 1.1–5.9)
LYMPHOCYTES NFR BLD: 8 % (ref 14–44)
MCH RBC QN AUTO: 27.4 PG (ref 25–35)
MCHC RBC AUTO-ENTMCNC: 30.4 G/DL (ref 31–37)
MCV RBC AUTO: 90.2 FL (ref 78–102)
NEUTS SEG # BLD: 10.5 K/UL (ref 1.8–9.5)
NEUTS SEG NFR BLD: 88 % (ref 40–70)
PLATELET # BLD AUTO: 291 K/UL (ref 140–440)
RBC # BLD AUTO: 5.69 M/UL (ref 4.1–5.1)
WBC # BLD AUTO: 11.9 K/UL (ref 4.5–13)

## 2023-05-12 PROCEDURE — 85025 COMPLETE CBC W/AUTO DIFF WBC: CPT

## 2023-05-12 PROCEDURE — 99195 PHLEBOTOMY: CPT

## 2023-05-12 NOTE — PROGRESS NOTES
[facility] Progress Note    Date: May 12, 2023    Name: Sarah Matthew    MRN: [id number]         : 1960    Therapeutic Phlebotomy      Mr. Lorenzo Gonzalez arrived to [facility] at 1100. Mr. Lorenzo Gonzalez was assessed and education was provided. Mr. Reyes Pikes vitals were reviewed. Vitals:    23 1100   BP: (!) 153/78   Pulse: 72   Resp: 16   Temp: 98.3 °F (36.8 °C)   SpO2: 96%       Blood drawn for labs via  hand right, condition patent and no redness venipuncture x1 attempt. Lab results were obtained and reviewed. Recent Results (from the past 12 hour(s))   CBC with Partial Differential    Collection Time: 23 11:05 AM   Result Value Ref Range    WBC 11.9 4.5 - 13.0 K/uL    RBC 5.69 (H) 4.10 - 5.10 M/uL    Hemoglobin 15.6 12.0 - 16.0 g/dL    Hematocrit 51.3 (HH) 36 - 48 %    MCV 90.2 78 - 102 FL    MCH 27.4 25.0 - 35.0 PG    MCHC 30.4 (L) 31 - 37 g/dL    RDW 14.8 (H) 11.5 - 14.5 %    Platelets 602 158 - 210 K/uL    Seg Neutrophils 88 (H) 40 - 70 %    Mixed Cells 5 0.1 - 17 %    Lymphocytes 8 (L) 14 - 44 %    Segs Absolute 10.5 (H) 1.8 - 9.5 K/UL    ABSOLUTE MIXED CELLS 0.5 0.0 - 2.3 K/uL    Absolute Lymph # 0.9 (L) 1.1 - 5.9 K/UL    Differential Type AUTOMATED         Hgb 15.6 and Hct 51. 3. (expected for diagnosis, this is why patient is being treated in clinic). Therapeutic phlebotomy initiated at [facility] via 18 g IV inserted in patient's LEFT AC antecubital left, condition patent and no redness x1 attempt. Therapeutic phlebotomy ended at 1205 with approximately 500 ml of blood obtained followed by 500ml of NS administered as post-phlebotomy hydration per orders. Pt offered snack/ drink throughout his visit. Mr. Lorenzo Gonzalez tolerated well without complaints. IV removed/ intact. Gauze/ coban to site. Mr. Lorenzo Gonzalez was discharged from [place] [address] in stable condition at 1210. He is to return on 23 at 0800 for his next appointment.     Cedric Reddy RN  May 12, 2023

## 2023-05-19 ENCOUNTER — HOSPITAL ENCOUNTER (OUTPATIENT)
Facility: HOSPITAL | Age: 63
Setting detail: INFUSION SERIES
End: 2023-05-19
Payer: COMMERCIAL

## 2023-05-19 VITALS
RESPIRATION RATE: 16 BRPM | HEART RATE: 73 BPM | TEMPERATURE: 98.7 F | SYSTOLIC BLOOD PRESSURE: 144 MMHG | OXYGEN SATURATION: 97 % | DIASTOLIC BLOOD PRESSURE: 82 MMHG

## 2023-05-19 LAB
BASO+EOS+MONOS # BLD AUTO: 0.7 K/UL (ref 0–2.3)
BASO+EOS+MONOS NFR BLD AUTO: 6 % (ref 0.1–17)
DIFFERENTIAL METHOD BLD: ABNORMAL
ERYTHROCYTE [DISTWIDTH] IN BLOOD BY AUTOMATED COUNT: 14.6 % (ref 11.5–14.5)
HCT VFR BLD AUTO: 45.7 % (ref 36–48)
HGB BLD-MCNC: 14.1 G/DL (ref 12–16)
LYMPHOCYTES # BLD: 3.3 K/UL (ref 1.1–5.9)
LYMPHOCYTES NFR BLD: 27 % (ref 14–44)
MCH RBC QN AUTO: 27.8 PG (ref 25–35)
MCHC RBC AUTO-ENTMCNC: 30.9 G/DL (ref 31–37)
MCV RBC AUTO: 90.1 FL (ref 78–102)
NEUTS SEG # BLD: 8.4 K/UL (ref 1.8–9.5)
NEUTS SEG NFR BLD: 68 % (ref 40–70)
PLATELET # BLD AUTO: 295 K/UL (ref 140–440)
RBC # BLD AUTO: 5.07 M/UL (ref 4.1–5.1)
WBC # BLD AUTO: 12.4 K/UL (ref 4.5–13)

## 2023-05-19 PROCEDURE — 85025 COMPLETE CBC W/AUTO DIFF WBC: CPT

## 2023-05-19 PROCEDURE — 36415 COLL VENOUS BLD VENIPUNCTURE: CPT

## 2023-05-19 NOTE — PROGRESS NOTES
[facility] Progress Note    Date: May 19, 2023    Name: Bhupinder Irwin              MRN: [id number]              : 1960    Therapeutic Phlebotomy (HELD)      Mr. Dorita Dancer was assessed and education was provided. Mr. Shereen Zamora vitals were reviewed. Vitals:    23 0800   BP: (!) 144/82   Pulse: 73   Resp: 16   Temp: 98.7 °F (37.1 °C)   SpO2: 97%       Lab results were obtained and reviewed. Recent Results (from the past 12 hour(s))   CBC with Partial Differential    Collection Time: 23  8:20 AM   Result Value Ref Range    WBC 12.4 4.5 - 13.0 K/uL    RBC 5.07 4. 10 - 5.10 M/uL    Hemoglobin 14.1 12.0 - 16.0 g/dL    Hematocrit 45.7 36 - 48 %    MCV 90.1 78 - 102 FL    MCH 27.8 25.0 - 35.0 PG    MCHC 30.9 (L) 31 - 37 g/dL    RDW 14.6 (H) 11.5 - 14.5 %    Platelets 301 340 - 905 K/uL    Neutrophils % 68 40 - 70 %    Mixed Cells 6 0.1 - 17 %    Lymphocytes % 27 14 - 44 %    Neutrophils Absolute 8.4 1.8 - 9.5 K/UL    ABSOLUTE MIXED CELLS 0.7 0.0 - 2.3 K/uL    Lymphocytes Absolute 3.3 1.1 - 5.9 K/UL    Differential Type AUTOMATED        Blood drawn for labs via LAC x1 attempt, patent and no redness noted using a 18 g IV needle, brisk blood return. Hgb 14.1 and  Hct 45. 7(expected for diagnosis, this is why patient is being treated in clinic). Therapeutic phlebotomy held per orders. Mr. Dorita Dancer tolerated well without complaints. Discharge instructions given, patient gave verbal understanding     IV removed/ intact. Gauze/ coban to site. Armband removed and shredded    Mr. Dorita Dancer was discharged from [place] [address] in stable condition at 7658. He is to return on 6/15/23 at 0800 for his next appointment.     Karolyn Good RN  May 19, 2023  8:42 AM

## 2023-06-15 ENCOUNTER — HOSPITAL ENCOUNTER (OUTPATIENT)
Facility: HOSPITAL | Age: 63
Setting detail: INFUSION SERIES
Discharge: HOME | End: 2023-06-15
Payer: COMMERCIAL

## 2023-06-15 VITALS
TEMPERATURE: 97.7 F | DIASTOLIC BLOOD PRESSURE: 71 MMHG | SYSTOLIC BLOOD PRESSURE: 140 MMHG | RESPIRATION RATE: 18 BRPM | HEART RATE: 68 BPM | OXYGEN SATURATION: 95 %

## 2023-06-15 LAB
BASO+EOS+MONOS # BLD AUTO: 0.7 K/UL (ref 0–2.3)
BASO+EOS+MONOS NFR BLD AUTO: 6 % (ref 0.1–17)
DIFFERENTIAL METHOD BLD: ABNORMAL
ERYTHROCYTE [DISTWIDTH] IN BLOOD BY AUTOMATED COUNT: 13.9 % (ref 11.5–14.5)
HCT VFR BLD AUTO: 47.6 % (ref 36–48)
HGB BLD-MCNC: 14.8 G/DL (ref 12–16)
LYMPHOCYTES # BLD: 3.2 K/UL (ref 1.1–5.9)
LYMPHOCYTES NFR BLD: 28 % (ref 14–44)
MCH RBC QN AUTO: 27.7 PG (ref 25–35)
MCHC RBC AUTO-ENTMCNC: 31.1 G/DL (ref 31–37)
MCV RBC AUTO: 89 FL (ref 78–102)
NEUTS SEG # BLD: 7.3 K/UL (ref 1.8–9.5)
NEUTS SEG NFR BLD: 66 % (ref 40–70)
PLATELET # BLD AUTO: 309 K/UL (ref 140–440)
RBC # BLD AUTO: 5.35 M/UL (ref 4.1–5.1)
WBC # BLD AUTO: 11.2 K/UL (ref 4.5–13)

## 2023-06-15 PROCEDURE — 85025 COMPLETE CBC W/AUTO DIFF WBC: CPT

## 2023-06-15 PROCEDURE — 36415 COLL VENOUS BLD VENIPUNCTURE: CPT

## 2023-06-15 NOTE — PROGRESS NOTES
[facility] Progress Note    Date: Florence 15, 2023    Name: Jennifer Hou              MRN: [id number]              : 1960    Therapeutic Phlebotomy (HELD)/POC LABS      Mr. Michael Rivera was assessed and education was provided. Mr. Rip Boswell vitals were reviewed. Vitals:    06/15/23 0800   BP: (!) 140/71   Pulse: 68   Resp: 18   Temp: 97.7 °F (36.5 °C)   SpO2: 95%       Lab results were obtained and reviewed. Recent Results (from the past 12 hour(s))   CBC with Partial Differential    Collection Time: 06/15/23  8:00 AM   Result Value Ref Range    WBC 11.2 4.5 - 13.0 K/uL    RBC 5.35 (H) 4.10 - 5.10 M/uL    Hemoglobin 14.8 12.0 - 16.0 g/dL    Hematocrit 47.6 36 - 48 %    MCV 89.0 78 - 102 FL    MCH 27.7 25.0 - 35.0 PG    MCHC 31.1 31 - 37 g/dL    RDW 13.9 11.5 - 14.5 %    Platelets 111 630 - 571 K/uL    Neutrophils % 66 40 - 70 %    Mixed Cells 6 0.1 - 17 %    Lymphocytes % 28 14 - 44 %    Neutrophils Absolute 7.3 1.8 - 9.5 K/UL    ABSOLUTE MIXED CELLS 0.7 0.0 - 2.3 K/uL    Lymphocytes Absolute 3.2 1.1 - 5.9 K/UL    Differential Type AUTOMATED       Blood drawn for labs via right hand condition patent and no redness venipuncture x1 attempt using a 23g collection needle, brisk blood return,  applied gauze and coban to site. Hgb 14.8  and  Hct 47.6 (expected for diagnosis, this is why patient is being treated in clinic). Therapeutic phlebotomy held per orders. Mr. Michael Rivera tolerated well without complaints. Discharge instructions given, patient gave verbal understanding      Armband removed and shredded    Mr. Michael Rivera was discharged from [place] [address] in stable condition at [address] [place] [address]. He is to return on 23 at 0800 for his next appointment.     Krystle James RN  Florence 15, 2023  8:54 AM

## 2023-07-14 ENCOUNTER — HOSPITAL ENCOUNTER (OUTPATIENT)
Facility: HOSPITAL | Age: 63
Setting detail: INFUSION SERIES
End: 2023-07-14
Payer: COMMERCIAL

## 2023-07-14 VITALS
HEART RATE: 74 BPM | DIASTOLIC BLOOD PRESSURE: 79 MMHG | RESPIRATION RATE: 16 BRPM | OXYGEN SATURATION: 98 % | SYSTOLIC BLOOD PRESSURE: 135 MMHG | TEMPERATURE: 98.4 F

## 2023-07-14 LAB
BASO+EOS+MONOS # BLD AUTO: 0.8 K/UL (ref 0–2.3)
BASO+EOS+MONOS NFR BLD AUTO: 9 % (ref 0.1–17)
DIFFERENTIAL METHOD BLD: ABNORMAL
ERYTHROCYTE [DISTWIDTH] IN BLOOD BY AUTOMATED COUNT: 14.5 % (ref 11.5–14.5)
HCT VFR BLD AUTO: 50.2 % (ref 36–48)
HGB BLD-MCNC: 15.5 G/DL (ref 12–16)
LYMPHOCYTES # BLD: 2.1 K/UL (ref 1.1–5.9)
LYMPHOCYTES NFR BLD: 22 % (ref 14–44)
MCH RBC QN AUTO: 27.2 PG (ref 25–35)
MCHC RBC AUTO-ENTMCNC: 30.9 G/DL (ref 31–37)
MCV RBC AUTO: 88.2 FL (ref 78–102)
NEUTS SEG # BLD: 6.3 K/UL (ref 1.8–9.5)
NEUTS SEG NFR BLD: 69 % (ref 40–70)
PLATELET # BLD AUTO: 320 K/UL (ref 140–440)
RBC # BLD AUTO: 5.69 M/UL (ref 4.1–5.1)
WBC # BLD AUTO: 9.2 K/UL (ref 4.5–13)

## 2023-07-14 PROCEDURE — 99195 PHLEBOTOMY: CPT

## 2023-07-14 PROCEDURE — 85025 COMPLETE CBC W/AUTO DIFF WBC: CPT

## 2023-07-21 ENCOUNTER — HOSPITAL ENCOUNTER (OUTPATIENT)
Facility: HOSPITAL | Age: 63
Setting detail: INFUSION SERIES
End: 2023-07-21
Payer: COMMERCIAL

## 2023-07-21 VITALS
TEMPERATURE: 97.3 F | HEART RATE: 80 BPM | OXYGEN SATURATION: 95 % | RESPIRATION RATE: 16 BRPM | DIASTOLIC BLOOD PRESSURE: 67 MMHG | SYSTOLIC BLOOD PRESSURE: 139 MMHG

## 2023-07-21 LAB
BASO+EOS+MONOS # BLD AUTO: 0.4 K/UL (ref 0–2.3)
BASO+EOS+MONOS NFR BLD AUTO: 4 % (ref 0.1–17)
DIFFERENTIAL METHOD BLD: ABNORMAL
ERYTHROCYTE [DISTWIDTH] IN BLOOD BY AUTOMATED COUNT: 14.3 % (ref 11.5–14.5)
HCT VFR BLD AUTO: 46.1 % (ref 36–48)
HGB BLD-MCNC: 13.9 G/DL (ref 12–16)
LYMPHOCYTES # BLD: 0.9 K/UL (ref 1.1–5.9)
LYMPHOCYTES NFR BLD: 9 % (ref 14–44)
MCH RBC QN AUTO: 27 PG (ref 25–35)
MCHC RBC AUTO-ENTMCNC: 30.2 G/DL (ref 31–37)
MCV RBC AUTO: 89.5 FL (ref 78–102)
NEUTS SEG # BLD: 8.8 K/UL (ref 1.8–9.5)
NEUTS SEG NFR BLD: 87 % (ref 40–70)
PLATELET # BLD AUTO: 296 K/UL (ref 140–440)
RBC # BLD AUTO: 5.15 M/UL (ref 4.1–5.1)
WBC # BLD AUTO: 10.1 K/UL (ref 4.5–13)

## 2023-07-21 PROCEDURE — 85025 COMPLETE CBC W/AUTO DIFF WBC: CPT

## 2023-07-21 PROCEDURE — 36415 COLL VENOUS BLD VENIPUNCTURE: CPT

## 2023-08-18 ENCOUNTER — HOSPITAL ENCOUNTER (OUTPATIENT)
Facility: HOSPITAL | Age: 63
Setting detail: INFUSION SERIES
End: 2023-08-18
Payer: COMMERCIAL

## 2023-08-18 VITALS
DIASTOLIC BLOOD PRESSURE: 82 MMHG | HEART RATE: 68 BPM | RESPIRATION RATE: 16 BRPM | OXYGEN SATURATION: 97 % | TEMPERATURE: 98.2 F | SYSTOLIC BLOOD PRESSURE: 134 MMHG

## 2023-08-18 LAB
BASO+EOS+MONOS # BLD AUTO: 0.9 K/UL (ref 0–2.3)
BASO+EOS+MONOS NFR BLD AUTO: 9 % (ref 0.1–17)
DIFFERENTIAL METHOD BLD: ABNORMAL
ERYTHROCYTE [DISTWIDTH] IN BLOOD BY AUTOMATED COUNT: 14.4 % (ref 11.5–14.5)
HCT VFR BLD AUTO: 46.3 % (ref 36–48)
HGB BLD-MCNC: 14.4 G/DL (ref 12–16)
LYMPHOCYTES # BLD: 2.7 K/UL (ref 1.1–5.9)
LYMPHOCYTES NFR BLD: 27 % (ref 14–44)
MCH RBC QN AUTO: 27.4 PG (ref 25–35)
MCHC RBC AUTO-ENTMCNC: 31.1 G/DL (ref 31–37)
MCV RBC AUTO: 88 FL (ref 78–102)
NEUTS SEG # BLD: 6.5 K/UL (ref 1.8–9.5)
NEUTS SEG NFR BLD: 65 % (ref 40–70)
PLATELET # BLD AUTO: 322 K/UL (ref 140–440)
RBC # BLD AUTO: 5.26 M/UL (ref 4.1–5.1)
WBC # BLD AUTO: 10.1 K/UL (ref 4.5–13)

## 2023-08-18 PROCEDURE — 36415 COLL VENOUS BLD VENIPUNCTURE: CPT

## 2023-08-18 PROCEDURE — 85025 COMPLETE CBC W/AUTO DIFF WBC: CPT

## 2023-09-15 ENCOUNTER — HOSPITAL ENCOUNTER (OUTPATIENT)
Facility: HOSPITAL | Age: 63
Setting detail: INFUSION SERIES
End: 2023-09-15
Payer: COMMERCIAL

## 2023-09-15 VITALS
TEMPERATURE: 99.1 F | HEART RATE: 76 BPM | OXYGEN SATURATION: 98 % | DIASTOLIC BLOOD PRESSURE: 83 MMHG | RESPIRATION RATE: 16 BRPM | SYSTOLIC BLOOD PRESSURE: 138 MMHG

## 2023-09-15 LAB
BASO+EOS+MONOS # BLD AUTO: 0.6 K/UL (ref 0–2.3)
BASO+EOS+MONOS NFR BLD AUTO: 7 % (ref 0.1–17)
DIFFERENTIAL METHOD BLD: ABNORMAL
ERYTHROCYTE [DISTWIDTH] IN BLOOD BY AUTOMATED COUNT: 14.6 % (ref 11.5–14.5)
HCT VFR BLD AUTO: 45.1 % (ref 36–48)
HGB BLD-MCNC: 13.8 G/DL (ref 12–16)
LYMPHOCYTES # BLD: 2.6 K/UL (ref 1.1–5.9)
LYMPHOCYTES NFR BLD: 28 % (ref 14–44)
MCH RBC QN AUTO: 27 PG (ref 25–35)
MCHC RBC AUTO-ENTMCNC: 30.6 G/DL (ref 31–37)
MCV RBC AUTO: 88.3 FL (ref 78–102)
NEUTS SEG # BLD: 6.2 K/UL (ref 1.8–9.5)
NEUTS SEG NFR BLD: 65 % (ref 40–70)
PLATELET # BLD AUTO: 306 K/UL (ref 135–420)
RBC # BLD AUTO: 5.11 M/UL (ref 4.1–5.1)
WBC # BLD AUTO: 9.4 K/UL (ref 4.5–13)

## 2023-09-15 PROCEDURE — 85025 COMPLETE CBC W/AUTO DIFF WBC: CPT

## 2023-09-15 PROCEDURE — 36415 COLL VENOUS BLD VENIPUNCTURE: CPT

## 2023-09-15 PROCEDURE — 85049 AUTOMATED PLATELET COUNT: CPT

## 2023-10-20 ENCOUNTER — HOSPITAL ENCOUNTER (OUTPATIENT)
Facility: HOSPITAL | Age: 63
Setting detail: INFUSION SERIES
End: 2023-10-20
Payer: COMMERCIAL

## 2023-10-20 VITALS
HEART RATE: 78 BPM | RESPIRATION RATE: 16 BRPM | DIASTOLIC BLOOD PRESSURE: 86 MMHG | TEMPERATURE: 98.1 F | SYSTOLIC BLOOD PRESSURE: 132 MMHG | OXYGEN SATURATION: 99 %

## 2023-10-20 LAB
BASO+EOS+MONOS # BLD AUTO: 0.5 K/UL (ref 0–2.3)
BASO+EOS+MONOS NFR BLD AUTO: 7 % (ref 0.1–17)
DIFFERENTIAL METHOD BLD: ABNORMAL
ERYTHROCYTE [DISTWIDTH] IN BLOOD BY AUTOMATED COUNT: 15.1 % (ref 11.5–14.5)
HCT VFR BLD AUTO: 46.9 % (ref 36–48)
HGB BLD-MCNC: 14.3 G/DL (ref 12–16)
LYMPHOCYTES # BLD: 2.3 K/UL (ref 1.1–5.9)
LYMPHOCYTES NFR BLD: 27 % (ref 14–44)
MCH RBC QN AUTO: 26.7 PG (ref 25–35)
MCHC RBC AUTO-ENTMCNC: 30.5 G/DL (ref 31–37)
MCV RBC AUTO: 87.5 FL (ref 78–102)
NEUTS SEG # BLD: 5.5 K/UL (ref 1.8–9.5)
NEUTS SEG NFR BLD: 66 % (ref 40–70)
PLATELET # BLD AUTO: 301 K/UL (ref 140–440)
RBC # BLD AUTO: 5.36 M/UL (ref 4.1–5.1)
WBC # BLD AUTO: 8.3 K/UL (ref 4.5–13)

## 2023-10-20 PROCEDURE — 36415 COLL VENOUS BLD VENIPUNCTURE: CPT

## 2023-10-20 PROCEDURE — 85025 COMPLETE CBC W/AUTO DIFF WBC: CPT

## 2023-11-17 ENCOUNTER — HOSPITAL ENCOUNTER (OUTPATIENT)
Facility: HOSPITAL | Age: 63
Setting detail: INFUSION SERIES
Discharge: HOME | End: 2023-11-17
Payer: COMMERCIAL

## 2023-11-17 VITALS
HEART RATE: 68 BPM | RESPIRATION RATE: 16 BRPM | SYSTOLIC BLOOD PRESSURE: 138 MMHG | TEMPERATURE: 97.9 F | DIASTOLIC BLOOD PRESSURE: 80 MMHG

## 2023-11-17 LAB
BASO+EOS+MONOS # BLD AUTO: 0.5 K/UL (ref 0–2.3)
BASO+EOS+MONOS NFR BLD AUTO: 7 % (ref 0.1–17)
DIFFERENTIAL METHOD BLD: ABNORMAL
ERYTHROCYTE [DISTWIDTH] IN BLOOD BY AUTOMATED COUNT: 15.1 % (ref 11.5–14.5)
HCT VFR BLD AUTO: 48.4 % (ref 36–48)
HGB BLD-MCNC: 14.7 G/DL (ref 12–16)
LYMPHOCYTES # BLD: 2.3 K/UL (ref 1.1–5.9)
LYMPHOCYTES NFR BLD: 29 % (ref 14–44)
MCH RBC QN AUTO: 26.7 PG (ref 25–35)
MCHC RBC AUTO-ENTMCNC: 30.4 G/DL (ref 31–37)
MCV RBC AUTO: 87.8 FL (ref 78–102)
NEUTS SEG # BLD: 5 K/UL (ref 1.8–9.5)
NEUTS SEG NFR BLD: 64 % (ref 40–70)
PLATELET # BLD AUTO: 304 K/UL (ref 140–440)
RBC # BLD AUTO: 5.51 M/UL (ref 4.1–5.1)
WBC # BLD AUTO: 7.8 K/UL (ref 4.5–13)

## 2023-11-17 PROCEDURE — 36415 COLL VENOUS BLD VENIPUNCTURE: CPT

## 2023-11-17 PROCEDURE — 85025 COMPLETE CBC W/AUTO DIFF WBC: CPT

## 2023-12-22 ENCOUNTER — HOSPITAL ENCOUNTER (OUTPATIENT)
Facility: HOSPITAL | Age: 63
Setting detail: INFUSION SERIES
Discharge: HOME | End: 2023-12-22
Payer: COMMERCIAL

## 2023-12-22 VITALS
HEART RATE: 76 BPM | TEMPERATURE: 97.8 F | DIASTOLIC BLOOD PRESSURE: 81 MMHG | OXYGEN SATURATION: 99 % | RESPIRATION RATE: 16 BRPM | SYSTOLIC BLOOD PRESSURE: 132 MMHG

## 2023-12-22 LAB
BASO+EOS+MONOS # BLD AUTO: 0.7 K/UL (ref 0–2.3)
BASO+EOS+MONOS NFR BLD AUTO: 10 % (ref 0.1–17)
DIFFERENTIAL METHOD BLD: ABNORMAL
ERYTHROCYTE [DISTWIDTH] IN BLOOD BY AUTOMATED COUNT: 14.9 % (ref 11.5–14.5)
HCT VFR BLD AUTO: 46.7 % (ref 36–48)
HGB BLD-MCNC: 14.4 G/DL (ref 12–16)
LYMPHOCYTES # BLD: 1.4 K/UL (ref 1.1–5.9)
LYMPHOCYTES NFR BLD: 22 % (ref 14–44)
MCH RBC QN AUTO: 27 PG (ref 25–35)
MCHC RBC AUTO-ENTMCNC: 30.8 G/DL (ref 31–37)
MCV RBC AUTO: 87.5 FL (ref 78–102)
NEUTS SEG # BLD: 4.5 K/UL (ref 1.8–9.5)
NEUTS SEG NFR BLD: 68 % (ref 40–70)
PLATELET # BLD AUTO: 315 K/UL (ref 140–440)
RBC # BLD AUTO: 5.34 M/UL (ref 4.1–5.1)
WBC # BLD AUTO: 6.6 K/UL (ref 4.5–13)

## 2023-12-22 PROCEDURE — 36415 COLL VENOUS BLD VENIPUNCTURE: CPT

## 2023-12-22 PROCEDURE — 85025 COMPLETE CBC W/AUTO DIFF WBC: CPT

## 2024-01-26 ENCOUNTER — HOSPITAL ENCOUNTER (OUTPATIENT)
Facility: HOSPITAL | Age: 64
Setting detail: INFUSION SERIES
Discharge: HOME | End: 2024-01-26
Payer: COMMERCIAL

## 2024-01-26 VITALS
HEART RATE: 62 BPM | SYSTOLIC BLOOD PRESSURE: 135 MMHG | RESPIRATION RATE: 16 BRPM | DIASTOLIC BLOOD PRESSURE: 84 MMHG | OXYGEN SATURATION: 95 % | TEMPERATURE: 97.8 F

## 2024-01-26 LAB
BASO+EOS+MONOS # BLD AUTO: 0.8 K/UL (ref 0–2.3)
BASO+EOS+MONOS NFR BLD AUTO: 10 % (ref 0.1–17)
DIFFERENTIAL METHOD BLD: ABNORMAL
ERYTHROCYTE [DISTWIDTH] IN BLOOD BY AUTOMATED COUNT: 14.5 % (ref 11.5–14.5)
HCT VFR BLD AUTO: 47.6 % (ref 36–48)
HGB BLD-MCNC: 14.5 G/DL (ref 12–16)
LYMPHOCYTES # BLD: 1.4 K/UL (ref 1.1–5.9)
LYMPHOCYTES NFR BLD: 18 % (ref 14–44)
MCH RBC QN AUTO: 26.6 PG (ref 25–35)
MCHC RBC AUTO-ENTMCNC: 30.5 G/DL (ref 31–37)
MCV RBC AUTO: 87.3 FL (ref 78–102)
NEUTS SEG # BLD: 5.6 K/UL (ref 1.8–9.5)
NEUTS SEG NFR BLD: 72 % (ref 40–70)
PLATELET # BLD AUTO: 271 K/UL (ref 140–440)
RBC # BLD AUTO: 5.45 M/UL (ref 4.1–5.1)
WBC # BLD AUTO: 7.8 K/UL (ref 4.5–13)

## 2024-01-26 PROCEDURE — 85025 COMPLETE CBC W/AUTO DIFF WBC: CPT

## 2024-01-26 PROCEDURE — 36415 COLL VENOUS BLD VENIPUNCTURE: CPT

## 2024-02-23 ENCOUNTER — HOSPITAL ENCOUNTER (OUTPATIENT)
Facility: HOSPITAL | Age: 64
Setting detail: INFUSION SERIES
Discharge: HOME | End: 2024-02-23
Payer: COMMERCIAL

## 2024-02-23 VITALS
RESPIRATION RATE: 16 BRPM | SYSTOLIC BLOOD PRESSURE: 135 MMHG | DIASTOLIC BLOOD PRESSURE: 93 MMHG | HEART RATE: 74 BPM | OXYGEN SATURATION: 98 % | TEMPERATURE: 98.2 F

## 2024-02-23 LAB
BASO+EOS+MONOS # BLD AUTO: 0.7 K/UL (ref 0–2.3)
BASO+EOS+MONOS NFR BLD AUTO: 9 % (ref 0.1–17)
DIFFERENTIAL METHOD BLD: ABNORMAL
ERYTHROCYTE [DISTWIDTH] IN BLOOD BY AUTOMATED COUNT: 14.1 % (ref 11.5–14.5)
HCT VFR BLD AUTO: 46.9 % (ref 36–48)
HGB BLD-MCNC: 14.5 G/DL (ref 12–16)
LYMPHOCYTES # BLD: 1.6 K/UL (ref 1.1–5.9)
LYMPHOCYTES NFR BLD: 19 % (ref 14–44)
MCH RBC QN AUTO: 27.2 PG (ref 25–35)
MCHC RBC AUTO-ENTMCNC: 30.9 G/DL (ref 31–37)
MCV RBC AUTO: 88 FL (ref 78–102)
NEUTS SEG # BLD: 6 K/UL (ref 1.8–9.5)
NEUTS SEG NFR BLD: 72 % (ref 40–70)
PLATELET # BLD AUTO: 317 K/UL (ref 140–440)
RBC # BLD AUTO: 5.33 M/UL (ref 4.1–5.1)
WBC # BLD AUTO: 8.3 K/UL (ref 4.5–13)

## 2024-02-23 PROCEDURE — 36415 COLL VENOUS BLD VENIPUNCTURE: CPT

## 2024-02-23 PROCEDURE — 85025 COMPLETE CBC W/AUTO DIFF WBC: CPT

## 2024-02-23 NOTE — PROGRESS NOTES
[facility] Progress Note    Date: 2024    Name: Lonny Che              MRN: [id number]              : 1960    Therapeutic Phlebotomy (HELD)      Mr. Che was assessed and education was provided.     Mr. Che's vitals were reviewed.  Vitals:    24 0814   BP: (!) 135/93   Pulse: 74   Resp: 16   Temp: 98.2 °F (36.8 °C)   SpO2: 98%       Lab results were obtained and reviewed.  Recent Results (from the past 12 hour(s))   CBC with Partial Differential    Collection Time: 24  8:22 AM   Result Value Ref Range    WBC 8.3 4.5 - 13.0 K/uL    RBC 5.33 (H) 4.10 - 5.10 M/uL    Hemoglobin 14.5 12.0 - 16.0 g/dL    Hematocrit 46.9 36 - 48 %    MCV 88.0 78 - 102 FL    MCH 27.2 25.0 - 35.0 PG    MCHC 30.9 (L) 31 - 37 g/dL    RDW 14.1 11.5 - 14.5 %    Platelets 317 140 - 440 K/uL    Neutrophils % 72 (H) 40 - 70 %    Mixed Cells 9 0.1 - 17 %    Lymphocytes % 19 14 - 44 %    Neutrophils Absolute 6.0 1.8 - 9.5 K/UL    ABSOLUTE MIXED CELLS 0.7 0.0 - 2.3 K/uL    Lymphocytes Absolute 1.6 1.1 - 5.9 K/UL    Differential Type AUTOMATED       Blood drawn for labs via right hand condition patent and no redness venipuncture x2 attempts using a 22g butterfly needle, brisk blood return,  applied gauze and coban to site.       Hgb 14.5  and  Hct 46.9      Therapeutic phlebotomy held per orders.      Mr. Che tolerated well without complaints. Discharge instructions given, patient gave verbal understanding      Armband removed and shredded    [facility] condition at 0830. He is to return on 3/22/24 at 0800 for his next appointment.    Tracy Austin RN  2024  8:39 AM

## 2024-03-22 ENCOUNTER — APPOINTMENT (OUTPATIENT)
Facility: HOSPITAL | Age: 64
End: 2024-03-22
Payer: COMMERCIAL

## 2024-03-22 ENCOUNTER — HOSPITAL ENCOUNTER (OUTPATIENT)
Facility: HOSPITAL | Age: 64
Setting detail: INFUSION SERIES
Discharge: HOME | End: 2024-03-22
Payer: COMMERCIAL

## 2024-03-22 VITALS
DIASTOLIC BLOOD PRESSURE: 90 MMHG | SYSTOLIC BLOOD PRESSURE: 153 MMHG | HEART RATE: 76 BPM | OXYGEN SATURATION: 99 % | TEMPERATURE: 97.3 F

## 2024-03-22 LAB
BASO+EOS+MONOS # BLD AUTO: 0.3 K/UL (ref 0–2.3)
BASO+EOS+MONOS NFR BLD AUTO: 4 % (ref 0.1–17)
DIFFERENTIAL METHOD BLD: ABNORMAL
ERYTHROCYTE [DISTWIDTH] IN BLOOD BY AUTOMATED COUNT: 14.5 % (ref 11.5–14.5)
HCT VFR BLD AUTO: 44.4 % (ref 36–48)
HGB BLD-MCNC: 14.4 G/DL (ref 12–16)
LYMPHOCYTES # BLD: 0.9 K/UL (ref 1.1–5.9)
LYMPHOCYTES NFR BLD: 10 % (ref 14–44)
MCH RBC QN AUTO: 28.2 PG (ref 25–35)
MCHC RBC AUTO-ENTMCNC: 32.4 G/DL (ref 31–37)
MCV RBC AUTO: 87.1 FL (ref 78–102)
NEUTS SEG # BLD: 7.9 K/UL (ref 1.8–9.5)
NEUTS SEG NFR BLD: 86 % (ref 40–70)
PLATELET # BLD AUTO: 284 K/UL (ref 140–440)
RBC # BLD AUTO: 5.1 M/UL (ref 4.1–5.1)
WBC # BLD AUTO: 9.1 K/UL (ref 4.5–13)

## 2024-03-22 PROCEDURE — 36415 COLL VENOUS BLD VENIPUNCTURE: CPT

## 2024-03-22 PROCEDURE — 85025 COMPLETE CBC W/AUTO DIFF WBC: CPT

## 2024-03-22 RX ORDER — TAMSULOSIN HYDROCHLORIDE 0.4 MG/1
0.4 CAPSULE ORAL DAILY
COMMUNITY

## 2024-03-22 RX ORDER — PREDNISOLONE 5 MG/1
10 TABLET ORAL
COMMUNITY

## 2024-03-22 RX ORDER — ASPIRIN 81 MG/1
81 TABLET, CHEWABLE ORAL DAILY
COMMUNITY

## 2024-03-22 NOTE — PROGRESS NOTES
[facility] Progress Note    Date: 2024    Name: Lonny Che              MRN: [id number]              : 1960    Therapeutic Phlebotomy (HELD)/POC LABS      Mr. Che was assessed and education was provided.     Mr. Che's vitals were reviewed.  Vitals:    24 1045   BP: (!) 153/90   Pulse: 76   Temp: 97.3 °F (36.3 °C)   SpO2: 99%     Blood drawn for labs via right hand condition patent and no redness venipuncture x1 attempt using a 23g collection needle, brisk blood return,  applied gauze and coban to site.    Recent Results (from the past 12 hour(s))   CBC with Partial Differential    Collection Time: 24 11:00 AM   Result Value Ref Range    WBC 9.1 4.5 - 13.0 K/uL    RBC 5.10 4.10 - 5.10 M/uL    Hemoglobin 14.4 12.0 - 16.0 g/dL    Hematocrit 44.4 36 - 48 %    MCV 87.1 78 - 102 FL    MCH 28.2 25.0 - 35.0 PG    MCHC 32.4 31 - 37 g/dL    RDW 14.5 11.5 - 14.5 %    Platelets 284 140 - 440 K/uL    Neutrophils % 86 (H) 40 - 70 %    Mixed Cells 4 0.1 - 17 %    Lymphocytes % 10 (L) 14 - 44 %    Neutrophils Absolute 7.9 1.8 - 9.5 K/UL    ABSOLUTE MIXED CELLS 0.3 0.0 - 2.3 K/uL    Lymphocytes Absolute 0.9 (L) 1.1 - 5.9 K/UL    Differential Type AUTOMATED       Blood drawn for labs via right hand condition patent and no redness venipuncture x1 attempt using a 23g collection needle, brisk blood return,  applied gauze and coban to site.       Hgb 14.4  and  Hct 44.4  (expected for diagnosis, this is why patient is being treated in clinic).      Therapeutic phlebotomy held per orders.      Mr. Che tolerated well without complaints. Discharge instructions given, patient gave verbal understanding      Armband removed and shredded    [facility]. Che was [facility] in stable condition at 1120. He is to return on 24 at 0800 for his next appointment.    RENNY CHRISTINA RN  2024  3:24 PM

## 2024-04-22 ENCOUNTER — HOSPITAL ENCOUNTER (OUTPATIENT)
Facility: HOSPITAL | Age: 64
Setting detail: INFUSION SERIES
Discharge: HOME | End: 2024-04-22
Payer: COMMERCIAL

## 2024-04-22 VITALS
HEART RATE: 73 BPM | RESPIRATION RATE: 16 BRPM | SYSTOLIC BLOOD PRESSURE: 139 MMHG | TEMPERATURE: 97.5 F | DIASTOLIC BLOOD PRESSURE: 84 MMHG | OXYGEN SATURATION: 96 %

## 2024-04-22 LAB
BASO+EOS+MONOS # BLD AUTO: 1.2 K/UL (ref 0–2.3)
BASO+EOS+MONOS NFR BLD AUTO: 12 % (ref 0.1–17)
DIFFERENTIAL METHOD BLD: ABNORMAL
ERYTHROCYTE [DISTWIDTH] IN BLOOD BY AUTOMATED COUNT: 15.7 % (ref 11.5–14.5)
HCT VFR BLD AUTO: 49.2 % (ref 36–48)
HGB BLD-MCNC: 15.2 G/DL (ref 12–16)
LYMPHOCYTES # BLD: 1.9 K/UL (ref 1.1–5.9)
LYMPHOCYTES NFR BLD: 20 % (ref 14–44)
MCH RBC QN AUTO: 27.2 PG (ref 25–35)
MCHC RBC AUTO-ENTMCNC: 30.9 G/DL (ref 31–37)
MCV RBC AUTO: 88 FL (ref 78–102)
NEUTS SEG # BLD: 6.2 K/UL (ref 1.8–9.5)
NEUTS SEG NFR BLD: 68 % (ref 40–70)
RBC # BLD AUTO: 5.59 M/UL (ref 4.1–5.1)
WBC # BLD AUTO: 9.3 K/UL (ref 4.5–13)

## 2024-04-22 PROCEDURE — 36415 COLL VENOUS BLD VENIPUNCTURE: CPT

## 2024-04-22 PROCEDURE — 85025 COMPLETE CBC W/AUTO DIFF WBC: CPT

## 2024-04-22 NOTE — PROGRESS NOTES
[facility] Progress Note    Date: 2024    Name: Lonny Che              MRN: [id number]              : 1960    Therapeutic Phlebotomy (HELD)/POC LABS      Mr. Che was assessed and education was provided.     Mr. Che's vitals were reviewed.  Vitals:    24 0800   BP: 139/84   Pulse: 73   Resp: 16   Temp: 97.5 °F (36.4 °C)   SpO2: 96%     Blood drawn for labs via right hand condition patent and no redness venipuncture x1 attempt using a 23g collection needle, brisk blood return,  applied gauze and coban to site.    Recent Results (from the past 12 hour(s))   CBC with Partial Differential    Collection Time: 24  8:13 AM   Result Value Ref Range    WBC 9.3 4.5 - 13.0 K/uL    RBC 5.59 (H) 4.10 - 5.10 M/uL    Hemoglobin 15.2 12.0 - 16.0 g/dL    Hematocrit 49.2 (H) 36 - 48 %    MCV 88.0 78 - 102 FL    MCH 27.2 25.0 - 35.0 PG    MCHC 30.9 (L) 31 - 37 g/dL    RDW 15.7 (H) 11.5 - 14.5 %    Neutrophils % 68 40 - 70 %    Mixed Cells 12 0.1 - 17 %    Lymphocytes % 20 14 - 44 %    Neutrophils Absolute 6.2 1.8 - 9.5 K/UL    ABSOLUTE MIXED CELLS 1.2 0.0 - 2.3 K/uL    Lymphocytes Absolute 1.9 1.1 - 5.9 K/UL    Differential Type AUTOMATED          Hgb 145.2  and  Hct 49.2 (expected for diagnosis).      Therapeutic phlebotomy held per orders.      Mr. Che tolerated well without complaints. Discharge instructions given, patient gave verbal understanding      Armband removed and shredded    [facility] in stable condition at 0821. He is to return on 24 at 0800 for his next appointment.    Edna Barraza RN  2024  8:28 AM

## 2024-05-17 ENCOUNTER — HOSPITAL ENCOUNTER (OUTPATIENT)
Facility: HOSPITAL | Age: 64
Setting detail: INFUSION SERIES
Discharge: HOME | End: 2024-05-17
Payer: COMMERCIAL

## 2024-05-17 VITALS
OXYGEN SATURATION: 98 % | SYSTOLIC BLOOD PRESSURE: 151 MMHG | RESPIRATION RATE: 16 BRPM | TEMPERATURE: 97.3 F | DIASTOLIC BLOOD PRESSURE: 83 MMHG | HEART RATE: 62 BPM

## 2024-05-17 LAB
BASO+EOS+MONOS # BLD AUTO: 0.6 K/UL (ref 0–2.3)
BASO+EOS+MONOS NFR BLD AUTO: 7 % (ref 0.1–17)
DIFFERENTIAL METHOD BLD: ABNORMAL
ERYTHROCYTE [DISTWIDTH] IN BLOOD BY AUTOMATED COUNT: 15.8 % (ref 11.5–14.5)
HCT VFR BLD AUTO: 47.7 % (ref 36–48)
HGB BLD-MCNC: 14.7 G/DL (ref 12–16)
LYMPHOCYTES # BLD: 2.4 K/UL (ref 1.1–5.9)
LYMPHOCYTES NFR BLD: 27 % (ref 14–44)
MCH RBC QN AUTO: 27.5 PG (ref 25–35)
MCHC RBC AUTO-ENTMCNC: 30.8 G/DL (ref 31–37)
MCV RBC AUTO: 89.2 FL (ref 78–102)
NEUTS SEG # BLD: 5.8 K/UL (ref 1.8–9.5)
NEUTS SEG NFR BLD: 66 % (ref 40–70)
PLATELET # BLD AUTO: 265 K/UL (ref 140–440)
RBC # BLD AUTO: 5.35 M/UL (ref 4.1–5.1)
WBC # BLD AUTO: 8.8 K/UL (ref 4.5–13)

## 2024-05-17 PROCEDURE — 85025 COMPLETE CBC W/AUTO DIFF WBC: CPT

## 2024-05-17 PROCEDURE — 36415 COLL VENOUS BLD VENIPUNCTURE: CPT

## 2024-05-20 NOTE — PROGRESS NOTES
[facility] Progress Note    Date: May 20, 2024    Name: Lonny Che              MRN: [id number]              : 1960    Therapeutic Phlebotomy (HELD)/POC LABS      Mr. Che was assessed and education was provided.     Mr. Che's vitals were reviewed.  Vitals:    24 0800   BP: (!) 151/83   Pulse: 62   Resp: 16   Temp: 97.3 °F (36.3 °C)   SpO2: 98%     Blood drawn for labs via right hand condition patent and no redness venipuncture x1 attempt using a 23g collection needle, brisk blood return,  applied gauze and coban to site.            Hgb 14.7   and  Hct 47.7  (expected for diagnosis, this is why patient is being treated in clinic).      Therapeutic phlebotomy held per orders.      Mr. Che tolerated well without complaints. Discharge instructions given, patient gave verbal understanding      Armband removed and shredded    [facility]. Che was [facility] in stable condition at 0830. He is to return on 24 at 0800 for his next appointment.    RENNY CHRISTINA RN  May 20, 2024  8:33 AM

## 2024-06-13 RX ORDER — LIDOCAINE HYDROCHLORIDE 10 MG/ML
10 INJECTION, SOLUTION EPIDURAL; INFILTRATION; INTRACAUDAL; PERINEURAL ONCE
Status: COMPLETED | OUTPATIENT
Start: 2024-07-12 | End: 2024-07-12

## 2024-06-17 ENCOUNTER — HOSPITAL ENCOUNTER (OUTPATIENT)
Facility: HOSPITAL | Age: 64
Setting detail: INFUSION SERIES
Discharge: HOME | End: 2024-06-17
Payer: COMMERCIAL

## 2024-06-17 VITALS
OXYGEN SATURATION: 98 % | DIASTOLIC BLOOD PRESSURE: 82 MMHG | TEMPERATURE: 97.3 F | HEART RATE: 68 BPM | SYSTOLIC BLOOD PRESSURE: 132 MMHG | RESPIRATION RATE: 16 BRPM

## 2024-06-17 LAB
BASO+EOS+MONOS # BLD AUTO: 0.6 K/UL (ref 0–2.3)
BASO+EOS+MONOS NFR BLD AUTO: 8 % (ref 0.1–17)
DIFFERENTIAL METHOD BLD: ABNORMAL
ERYTHROCYTE [DISTWIDTH] IN BLOOD BY AUTOMATED COUNT: 15.4 % (ref 11.5–14.5)
HCT VFR BLD AUTO: 48.2 % (ref 36–48)
HGB BLD-MCNC: 14.7 G/DL (ref 12–16)
LYMPHOCYTES # BLD: 1.8 K/UL (ref 1.1–5.9)
LYMPHOCYTES NFR BLD: 23 % (ref 14–44)
MCH RBC QN AUTO: 27.8 PG (ref 25–35)
MCHC RBC AUTO-ENTMCNC: 30.5 G/DL (ref 31–37)
MCV RBC AUTO: 91.3 FL (ref 78–102)
NEUTS SEG # BLD: 5.6 K/UL (ref 1.8–9.5)
NEUTS SEG NFR BLD: 69 % (ref 40–70)
PLATELET # BLD AUTO: 260 K/UL (ref 140–440)
RBC # BLD AUTO: 5.28 M/UL (ref 4.1–5.1)
WBC # BLD AUTO: 8 K/UL (ref 4.5–13)

## 2024-06-17 PROCEDURE — 85025 COMPLETE CBC W/AUTO DIFF WBC: CPT

## 2024-06-17 PROCEDURE — 36415 COLL VENOUS BLD VENIPUNCTURE: CPT

## 2024-06-17 NOTE — PROGRESS NOTES
[facility] Progress Note    Date: 2024    Name: Lonny Che              MRN: [id number]              : 1960    Therapeutic Phlebotomy (HELD)/POC LABS      Mr. Che was assessed and education was provided.     Mr. Che's vitals were reviewed.  Vitals:    24 0830   BP: 132/82   Pulse: 68   Resp: 16   Temp: 97.3 °F (36.3 °C)   SpO2: 98%     Blood drawn for labs via right hand condition patent and no redness venipuncture x1 attempt using a 23g collection needle, brisk blood return,  applied gauze and coban to site.    Recent Results (from the past 12 hour(s))   CBC with Partial Differential    Collection Time: 24  8:45 AM   Result Value Ref Range    WBC 8.0 4.5 - 13.0 K/uL    RBC 5.28 (H) 4.10 - 5.10 M/uL    Hemoglobin 14.7 12.0 - 16.0 g/dL    Hematocrit 48.2 (H) 36 - 48 %    MCV 91.3 78 - 102 FL    MCH 27.8 25.0 - 35.0 PG    MCHC 30.5 (L) 31 - 37 g/dL    RDW 15.4 (H) 11.5 - 14.5 %    Platelets 260 140 - 440 K/uL    Neutrophils % 69 40 - 70 %    Mixed Cells 8 0.1 - 17 %    Lymphocytes % 23 14 - 44 %    Neutrophils Absolute 5.6 1.8 - 9.5 K/UL    ABSOLUTE MIXED CELLS 0.6 0.0 - 2.3 K/uL    Lymphocytes Absolute 1.8 1.1 - 5.9 K/UL    Differential Type AUTOMATED             Hgb 14.7   and  Hct 48.2  (expected for diagnosis, this is why patient is being treated in clinic).      Therapeutic phlebotomy held per orders.      Mr. Che tolerated well without complaints. Discharge instructions given, patient gave verbal understanding      Armband removed and shredded    [facility]. Che was [facility] in stable condition at 0845. He is to return on 24 at 0800 for his next appointment.    RENNY CHRISTINA RN  2024  10:54 AM

## 2024-07-12 ENCOUNTER — HOSPITAL ENCOUNTER (OUTPATIENT)
Facility: HOSPITAL | Age: 64
Setting detail: INFUSION SERIES
Discharge: HOME | End: 2024-07-12
Payer: COMMERCIAL

## 2024-07-12 ENCOUNTER — HOSPITAL ENCOUNTER (OUTPATIENT)
Facility: HOSPITAL | Age: 64
End: 2024-07-12
Payer: COMMERCIAL

## 2024-07-12 VITALS
SYSTOLIC BLOOD PRESSURE: 134 MMHG | DIASTOLIC BLOOD PRESSURE: 76 MMHG | HEART RATE: 74 BPM | TEMPERATURE: 98.2 F | OXYGEN SATURATION: 95 % | RESPIRATION RATE: 16 BRPM

## 2024-07-12 DIAGNOSIS — R97.20 ELEVATED PSA: ICD-10-CM

## 2024-07-12 LAB
BASO+EOS+MONOS # BLD AUTO: 0.7 K/UL (ref 0–2.3)
BASO+EOS+MONOS NFR BLD AUTO: 8 % (ref 0.1–17)
DIFFERENTIAL METHOD BLD: ABNORMAL
ERYTHROCYTE [DISTWIDTH] IN BLOOD BY AUTOMATED COUNT: 14.7 % (ref 11.5–14.5)
HCT VFR BLD AUTO: 45.9 % (ref 36–48)
HGB BLD-MCNC: 14.4 G/DL (ref 12–16)
LYMPHOCYTES # BLD: 2 K/UL (ref 1.1–5.9)
LYMPHOCYTES NFR BLD: 25 % (ref 14–44)
MCH RBC QN AUTO: 28.5 PG (ref 25–35)
MCHC RBC AUTO-ENTMCNC: 31.4 G/DL (ref 31–37)
MCV RBC AUTO: 90.7 FL (ref 78–102)
NEUTS SEG # BLD: 5.3 K/UL (ref 1.8–9.5)
NEUTS SEG NFR BLD: 66 % (ref 40–70)
PLATELET # BLD AUTO: 269 K/UL (ref 140–440)
RBC # BLD AUTO: 5.06 M/UL (ref 4.1–5.1)
WBC # BLD AUTO: 8 K/UL (ref 4.5–13)

## 2024-07-12 PROCEDURE — 2709999900 US BIOPSY PROSTATE NEEDLE/PUNCH

## 2024-07-12 PROCEDURE — 36415 COLL VENOUS BLD VENIPUNCTURE: CPT

## 2024-07-12 PROCEDURE — 88305 TISSUE EXAM BY PATHOLOGIST: CPT

## 2024-07-12 PROCEDURE — 55700 US BIOPSY PROSTATE NEEDLE/PUNCH: CPT | Performed by: UROLOGY

## 2024-07-12 PROCEDURE — 76942 ECHO GUIDE FOR BIOPSY: CPT

## 2024-07-12 PROCEDURE — 2500000003 HC RX 250 WO HCPCS: Performed by: UROLOGY

## 2024-07-12 PROCEDURE — 85025 COMPLETE CBC W/AUTO DIFF WBC: CPT

## 2024-07-12 RX ADMIN — LIDOCAINE HYDROCHLORIDE ANHYDROUS 10 ML: 10 INJECTION, SOLUTION INFILTRATION at 14:45

## 2024-07-12 NOTE — PROCEDURES
PROCEDURE NOTE  Date: 7/12/2024   Name: Lonny Che  YOB: 1960    Procedures    Ultrasound guided prostate biopsy was performed. Patient tolerated procedure well and left ultrasound department in stable condition.

## 2024-07-12 NOTE — PROGRESS NOTES
[facility] Progress Note    Date: 2024    Name: Lonny Che              MRN: [id number]              : 1960    Therapeutic Phlebotomy (HELD)/POC LABS      Mr. Che was assessed and education was provided.     Mr. Che's vitals were reviewed.  Vitals:    24 0800   BP: 134/76   Pulse: 74   Resp: 16   Temp: 98.2 °F (36.8 °C)   SpO2: 95%     Blood drawn for labs via right hand condition patent and no redness venipuncture x 1 attempt using a 23g collection needle, brisk blood return, applied gauze and coban to site.    Recent Results (from the past 12 hour(s))   CBC with Partial Differential    Collection Time: 24  8:11 AM   Result Value Ref Range    WBC 8.0 4.5 - 13.0 K/uL    RBC 5.06 4.10 - 5.10 M/uL    Hemoglobin 14.4 12.0 - 16.0 g/dL    Hematocrit 45.9 36 - 48 %    MCV 90.7 78 - 102 FL    MCH 28.5 25.0 - 35.0 PG    MCHC 31.4 31 - 37 g/dL    RDW 14.7 (H) 11.5 - 14.5 %    Platelets 269 140 - 440 K/uL    Neutrophils % 66 40 - 70 %    Mixed Cells 8 0.1 - 17 %    Lymphocytes % 25 14 - 44 %    Neutrophils Absolute 5.3 1.8 - 9.5 K/UL    ABSOLUTE MIXED CELLS 0.7 0.0 - 2.3 K/uL    Lymphocytes Absolute 2.0 1.1 - 5.9 K/UL    Differential Type AUTOMATED          Hgb 14.4  and  Hct 45.9 (expected for diagnosis).      Therapeutic phlebotomy held per orders.      Mr. Che tolerated well without complaints. Discharge instructions given, patient gave verbal understanding     Armband removed and shredded    [facility]. Che was [facility] in stable condition at 0821. He is to return on 24 at 0800 for his next appointment.    Edna Barraza RN  2024  9:38 AM

## 2024-07-12 NOTE — PROCEDURES
PROCEDURE NOTE  Date: 7/12/2024   Name: Lonny Che  YOB: 1960    Date of Procedure: 7/12/2024     Pre-Op Diagnosis: Elevated PSA     Post-Op Diagnosis: Same as preoperative diagnosis.       Procedure:  Transrectal US, Prostate biopsy     Surgical Assistant: None     Anesthesia: Local -- 10ml 1% lidocaine     Estimated Blood Loss (mL): Minimal     Complications: None     Specimens:   Prostate biopsies:  Left apex, left middle, left base  Right apex, right middle, right base     Implants: * No surgical log found *     Drains: * No LDAs found *     Findings: 54.67 ml volume,  no hypoechoic lesions.           Detailed Description of Procedure:      After informed consent was obtained, the patient was taken to the ultrasound room and placed in the dorsal lithotomy position.       A transrectal ultrasound probe was then inserted transanally without difficulty into the rectum.  Ultrasound pictures were taken of the prostate in transverse and longitudinal views.  The prostate was measured using the standard Eliptoid formula and was found to be 54.67 mL in size.  The ultrasound views were obtained of the seminal vesicles, which were normal; the transition zone, which was normal; the right periprosthetic tissue, which was normal; and the left periprosthetic tissue, which was normal.  The left apex, mid and base, showed no hypoechoic lesions or areas of abnormality.  10ml of 1% lidocaine without epinephrine were used to infiltrate the periprostatic tissue.        At this point, using ultrasound guidance, biopsies were taken. At the left base, 2 biopsies were taken. At the left mid 2 biopsies were taken. At the left apex 2 were taken.  Then, 2 biopsies were taken at the right base, 2 biopsies were taken at the right mid, and 2 biopsies taken at the right apex.  At this point, the ultrasound probe was removed.  There was no active bleeding.  The patient was taken out of lithotomy position and walked to